# Patient Record
Sex: FEMALE | Race: WHITE | HISPANIC OR LATINO | Employment: STUDENT | URBAN - METROPOLITAN AREA
[De-identification: names, ages, dates, MRNs, and addresses within clinical notes are randomized per-mention and may not be internally consistent; named-entity substitution may affect disease eponyms.]

---
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
## 2022-02-23 ENCOUNTER — TELEPHONE (OUTPATIENT)
Dept: OTHER | Facility: OTHER | Age: 22
End: 2022-02-23

## 2022-02-23 NOTE — TELEPHONE ENCOUNTER
Patient is requesting to speak to Dr Kimber Purcell in regard to breast reduction  her insurance wants a letter sent to them on her behalf stating why it is needed and how her breast cause pain and issues for her  Patient has [employer]  Please advise if this letter could be sent

## 2022-03-16 ENCOUNTER — OFFICE VISIT (OUTPATIENT)
Dept: FAMILY MEDICINE CLINIC | Facility: CLINIC | Age: 22
End: 2022-03-16
Payer: COMMERCIAL

## 2022-03-16 VITALS
WEIGHT: 183.6 LBS | HEART RATE: 87 BPM | OXYGEN SATURATION: 98 % | RESPIRATION RATE: 15 BRPM | DIASTOLIC BLOOD PRESSURE: 70 MMHG | SYSTOLIC BLOOD PRESSURE: 100 MMHG | BODY MASS INDEX: 37.01 KG/M2 | TEMPERATURE: 97.9 F | HEIGHT: 59 IN

## 2022-03-16 DIAGNOSIS — N62 LARGE BREASTS: Primary | ICD-10-CM

## 2022-03-16 PROBLEM — G89.29 CHRONIC BILATERAL THORACIC BACK PAIN: Status: ACTIVE | Noted: 2022-03-16

## 2022-03-16 PROBLEM — M54.6 CHRONIC BILATERAL THORACIC BACK PAIN: Status: ACTIVE | Noted: 2022-03-16

## 2022-03-16 PROBLEM — R05.9 COUGH: Status: RESOLVED | Noted: 2020-08-21 | Resolved: 2022-03-16

## 2022-03-16 PROBLEM — J06.9 VIRAL UPPER RESPIRATORY TRACT INFECTION: Status: RESOLVED | Noted: 2020-02-10 | Resolved: 2022-03-16

## 2022-03-16 PROCEDURE — 99213 OFFICE O/P EST LOW 20 MIN: CPT | Performed by: FAMILY MEDICINE

## 2022-03-16 NOTE — PROGRESS NOTES
Assessment/Plan:    1  Large breasts  Assessment & Plan:  Discussed reduction with pt  Will schedule appt    Orders:  -     Ambulatory Referral to Plastic Surgery; Future    BMI Counseling: Body mass index is 37 21 kg/m²  The BMI is above normal  Nutrition recommendations include encouraging healthy choices of fruits and vegetables  Exercise recommendations include exercising 3-5 times per week  No pharmacotherapy was ordered  Rationale for BMI follow-up plan is due to patient being overweight or obese  Depression Screening and Follow-up Plan: Patient was screened for depression during today's encounter  They screened negative with a PHQ-2 score of 0  There are no Patient Instructions on file for this visit  No follow-ups on file  Subjective:      Patient ID: Kristy Carr is a 25 y o  female  Chief Complaint   Patient presents with    Back Pain     Patient here with back pain feels it's coming from her breast bra digs into shoulders rash under breast       Thinking about breast reduction  Wearing sports bras- possible G  Having chronic back pain  Trapezium pain  Has bran lines in shoulders  Neck pain into lower back    Back Pain  This is a chronic problem  The current episode started more than 1 year ago  The problem occurs constantly  The pain is present in the thoracic spine and lumbar spine  Treatments tried: massage, icy hot  The treatment provided no relief  The following portions of the patient's history were reviewed and updated as appropriate: allergies, current medications, past family history, past medical history, past social history, past surgical history and problem list     Review of Systems   Constitutional: Negative  HENT: Negative  Eyes: Negative  Respiratory: Negative  Cardiovascular: Negative  Gastrointestinal: Negative  Endocrine: Negative  Genitourinary: Negative  Musculoskeletal: Positive for back pain and neck pain     Allergic/Immunologic: Negative  Neurological: Negative  Hematological: Negative  Psychiatric/Behavioral: Negative  No current outpatient medications on file  No current facility-administered medications for this visit  Objective:    /70 (BP Location: Left arm, Patient Position: Sitting, Cuff Size: Large)   Pulse 87   Temp 97 9 °F (36 6 °C)   Resp 15   Ht 4' 10 9" (1 496 m)   Wt 83 3 kg (183 lb 9 6 oz)   SpO2 98%   BMI 37 21 kg/m²        Physical Exam  Vitals and nursing note reviewed  Constitutional:       Appearance: Normal appearance  HENT:      Head: Normocephalic and atraumatic  Cardiovascular:      Rate and Rhythm: Normal rate and regular rhythm  Pulses: Normal pulses  Heart sounds: Normal heart sounds  Pulmonary:      Effort: Pulmonary effort is normal       Breath sounds: Normal breath sounds  Musculoskeletal:         General: Tenderness and deformity (bra lines in shoulder) present  Neurological:      General: No focal deficit present  Mental Status: She is alert and oriented to person, place, and time  Psychiatric:         Mood and Affect: Mood normal          Behavior: Behavior normal          Thought Content:  Thought content normal          Judgment: Judgment normal                 Kathy Morale, DO

## 2022-03-22 ENCOUNTER — OFFICE VISIT (OUTPATIENT)
Dept: URGENT CARE | Age: 22
End: 2022-03-22
Payer: COMMERCIAL

## 2022-03-22 VITALS
DIASTOLIC BLOOD PRESSURE: 60 MMHG | WEIGHT: 186 LBS | BODY MASS INDEX: 37.5 KG/M2 | TEMPERATURE: 98 F | RESPIRATION RATE: 18 BRPM | SYSTOLIC BLOOD PRESSURE: 141 MMHG | HEART RATE: 98 BPM | OXYGEN SATURATION: 98 % | HEIGHT: 59 IN

## 2022-03-22 DIAGNOSIS — J30.1 SEASONAL ALLERGIC RHINITIS DUE TO POLLEN: Primary | ICD-10-CM

## 2022-03-22 PROCEDURE — 99213 OFFICE O/P EST LOW 20 MIN: CPT

## 2022-03-22 RX ORDER — LORATADINE 10 MG/1
10 TABLET ORAL DAILY
Qty: 30 TABLET | Refills: 0 | Status: SHIPPED | OUTPATIENT
Start: 2022-03-22

## 2022-03-22 NOTE — PATIENT INSTRUCTIONS
Allergic Rhinitis   WHAT YOU NEED TO KNOW:   Allergic rhinitis, or hay fever, is swelling of the inside of your nose  The swelling is a reaction to allergens in the air  An allergen can be anything that causes an allergic reaction  Allergies to weeds, grass, trees, or mold often cause seasonal allergic rhinitis  Indoor dust mites, cockroaches, pet dander, or mold can also cause allergic rhinitis  DISCHARGE INSTRUCTIONS:   Call 911 for the following:   · You have chest pain or shortness of breath  Return to the emergency department if:   · You have severe pain  · You cough up blood  Contact your healthcare provider if:   · You have a fever  · You have ear or sinus pain, or a headache  · Your symptoms get worse, even after treatment  · You have yellow, green, brown, or bloody mucus coming from your nose  · Your nose is bleeding or you have pain inside your nose  · You have trouble sleeping because of your symptoms  · You have questions or concerns about your condition or care  Medicines:   · Medicines  help decrease your symptoms and clear your stuffy nose  · Take your medicine as directed  Contact your healthcare provider if you think your medicine is not helping or if you have side effects  Tell him of her if you are allergic to any medicine  Keep a list of the medicines, vitamins, and herbs you take  Include the amounts, and when and why you take them  Bring the list or the pill bottles to follow-up visits  Carry your medicine list with you in case of an emergency  How to manage allergic rhinitis:  The best way to manage allergic rhinitis is to avoid allergens that can trigger your symptoms  Any of the following may help decrease your symptoms:  · Rinse your nose and sinuses  with a salt water solution or use a salt water nasal spray  This will help thin the mucus in your nose and rinse away pollen and dirt  It will also help reduce swelling so you can breathe normally  Ask your healthcare provider how often to rinse your nose  · Reduce exposure to dust mites  Wash sheets and towels in hot water every week  Cover your pillows and mattresses with allergen-free covers  Limit the number of stuffed animals and soft toys your child has  Wash your child's toys in hot water regularly  Vacuum weekly and use a vacuum  with an air filter  If possible, get rid of carpets and curtains  These collect dust and dust mites  · Reduce exposure to pollen  Keep windows and doors closed in your house and car  Stay inside when air pollution or the pollen count is high  Run your air conditioner on recycle, and change air filters often  Shower and wash your hair before bed every night to rinse away pollen  · Reduce exposure to pet dander  If possible, do not keep cats, dogs, birds, or other pets  If you do keep pets in your home, keep them out of bedrooms and carpeted rooms  Bathe them often  · Reduce exposure to mold  Do not spend time in basements  Choose artificial plants instead of live plants  Keep your home's humidity at less than 45%  Do not have ponds or standing water in your home or yard  · Do not smoke  Avoid others who smoke  Ask your healthcare provider for information if you currently smoke and need help to quit  Follow up with your healthcare provider as directed: You may need to see an allergist often to control your symptoms  Write down your questions so you remember to ask them during your visits  © [employer] 2022 Information is for End User's use only and may not be sold, redistributed or otherwise used for commercial purposes  All illustrations and images included in CareNotes® are the copyrighted property of A D A M , Inc  or [facility] Cheyenne Caldwell   The above information is an  only  It is not intended as medical advice for individual conditions or treatments   Talk to your doctor, nurse or pharmacist before following any medical regimen to see if it is safe and effective for you

## 2022-03-22 NOTE — PROGRESS NOTES
[facility]        NAME: Belinda Sousa is a 25 y o  female  : 2000    MRN: [id number]  DATE: 2022  TIME: 1:36 PM    Assessment and Plan   Seasonal allergic rhinitis due to pollen [J30 1]  1  Seasonal allergic rhinitis due to pollen  loratadine (CLARITIN) 10 mg tablet       The patient has a history of seasonal allergies  Currently having congestion and post nasal drip  No fevers, no nausea  Will order Claritin  Patient Instructions     Take daily Claritin  Follow up with PCP as needed  Proceed to  ER if symptoms worsen  Chief Complaint     Chief Complaint   Patient presents with    Vomiting     pt states she has been vomting since   No diarrhea reported  History of Present Illness       The patient is a 24 y/o female who presents with complaints of  vomiting at night for 2 days  Has had 2 episodes of vomitting in the past 2 days  Denies nausea, denies fevers  Also has c/o congestion  Able to eat/drink without issue  Does have seasonal allergies  She is not taking anything currently  Vomiting   This is a new problem  The current episode started in the past 7 days  The problem occurs less than 2 times per day  The problem has been unchanged  There has been no fever  Associated symptoms include URI  Pertinent negatives include no abdominal pain, chest pain, chills, coughing, diarrhea, fever, headaches or weight loss  Risk factors include ill contacts  She has tried nothing for the symptoms  The treatment provided no relief  Review of Systems   Review of Systems   Constitutional: Negative for chills, fever and weight loss  HENT: Positive for congestion, postnasal drip and rhinorrhea  Negative for ear pain and sinus pain  Throat scratchy   Respiratory: Negative for cough  Cardiovascular: Negative for chest pain  Gastrointestinal: Positive for vomiting  Negative for abdominal pain and diarrhea     Neurological: Negative for weakness, light-headedness and headaches  Current Medications       Current Outpatient Medications:     loratadine (CLARITIN) 10 mg tablet, Take 1 tablet (10 mg total) by mouth daily, Disp: 30 tablet, Rfl: 0    Current Allergies     Allergies as of 03/22/2022 - Reviewed 03/22/2022   Allergen Reaction Noted    Dust mite extract Wheezing 04/14/2014            The following portions of the patient's history were reviewed and updated as appropriate: allergies, current medications, past family history, past medical history, past social history, past surgical history and problem list      Past Medical History:   Diagnosis Date    Asthma     Goiter     r/s 8/2016       History reviewed  No pertinent surgical history  Family History   Problem Relation Age of Onset    Arthritis Father     Hypertension Father     Diabetes Father     Benign prostatic hyperplasia Father     Arthritis Sister          Medications have been verified  Objective   /60   Pulse 98   Temp 98 °F (36 7 °C)   Resp 18   Ht 4' 11" (1 499 m)   Wt 84 4 kg (186 lb)   LMP 03/07/2022 (Exact Date)   SpO2 98%   BMI 37 57 kg/m²   Patient's last menstrual period was 03/07/2022 (exact date)  Physical Exam     Physical Exam  Vitals reviewed  Constitutional:       General: She is not in acute distress  Appearance: Normal appearance  She is obese  HENT:      Head: Normocephalic  Right Ear: Tympanic membrane, ear canal and external ear normal       Left Ear: Tympanic membrane, ear canal and external ear normal       Nose: Congestion and rhinorrhea present  Mouth/Throat:      Mouth: Mucous membranes are moist       Pharynx: Oropharynx is clear  No posterior oropharyngeal erythema  Eyes:      Extraocular Movements: Extraocular movements intact  Pupils: Pupils are equal, round, and reactive to light  Cardiovascular:      Rate and Rhythm: Normal rate and regular rhythm  Pulses: Normal pulses        Heart sounds: Normal heart sounds  Pulmonary:      Effort: Pulmonary effort is normal       Breath sounds: Normal breath sounds  Abdominal:      General: Bowel sounds are normal       Palpations: Abdomen is soft  Musculoskeletal:         General: Normal range of motion  Cervical back: Normal range of motion  Skin:     General: Skin is warm and dry  Capillary Refill: Capillary refill takes less than 2 seconds  Neurological:      General: No focal deficit present  Mental Status: She is alert and oriented to person, place, and time  Mental status is at baseline  Psychiatric:         Mood and Affect: Mood normal          Behavior: Behavior normal          Thought Content:  Thought content normal          Judgment: Judgment normal

## 2022-04-12 ENCOUNTER — TELEPHONE (OUTPATIENT)
Dept: PLASTIC SURGERY | Facility: CLINIC | Age: 22
End: 2022-04-12

## 2022-04-12 NOTE — TELEPHONE ENCOUNTER
As patient was in school, spoke to mother who called back about a breast reduction appointment  Reviewed criteria and patient will let us know when one more month of documentation and pt is had in order to make appointment  Also emailing letter to patient with criteria

## 2022-04-28 ENCOUNTER — TELEMEDICINE (OUTPATIENT)
Dept: FAMILY MEDICINE CLINIC | Facility: CLINIC | Age: 22
End: 2022-04-28
Payer: COMMERCIAL

## 2022-04-28 VITALS — TEMPERATURE: 96.1 F

## 2022-04-28 DIAGNOSIS — J06.9 ACUTE UPPER RESPIRATORY INFECTION: Primary | ICD-10-CM

## 2022-04-28 PROCEDURE — 99213 OFFICE O/P EST LOW 20 MIN: CPT | Performed by: NURSE PRACTITIONER

## 2022-04-28 RX ORDER — AMOXICILLIN 875 MG/1
875 TABLET, COATED ORAL 2 TIMES DAILY
Qty: 20 TABLET | Refills: 0 | Status: SHIPPED | OUTPATIENT
Start: 2022-04-28 | End: 2022-05-08

## 2022-04-28 NOTE — PROGRESS NOTES
Virtual Regular Visit    Verification of patient location:    Patient is located in the following state in which I hold an active license [place]      Assessment/Plan:    Problem List Items Addressed This Visit     None      Visit Diagnoses     Acute upper respiratory infection    -  Primary    Relevant Medications    amoxicillin (AMOXIL) 875 mg tablet               Reason for visit is   Chief Complaint   Patient presents with    Virtual Regular Visit    Cold Like Symptoms        Encounter provider DENNY Shah    Provider located at [place] [address] [place] [id number]      Recent Visits  No visits were found meeting these conditions  Showing recent visits within past 7 days and meeting all other requirements  Today's Visits  Date Type Provider Dept   04/28/22 Telemedicine DENNY Shah [facility]   Showing today's visits and meeting all other requirements  Future Appointments  No visits were found meeting these conditions  Showing future appointments within next 150 days and meeting all other requirements       The patient was identified by name and date of birth  Chiqui Pineda was informed that this is a telemedicine visit and that the visit is being conducted through [address] [place] [address] Now and patient was informed that this is a secure, HIPAA-compliant platform  She agrees to proceed     My office door was closed  No one else was in the room  She acknowledged consent and understanding of privacy and security of the video platform  The patient has agreed to participate and understands they can discontinue the visit at any time  Patient is aware this is a billable service  Subjective  Chiqui Pineda is a 25 y o  female          Started to get sick 2 days ago  Stuffy nose and dry throat  Headache  No fever  No cough  Took allergy meds for this  Feels like cold  No risk of covid or flu           Past Medical History:   Diagnosis Date    Asthma  Goiter     r/s 8/2016       History reviewed  No pertinent surgical history  Current Outpatient Medications   Medication Sig Dispense Refill    amoxicillin (AMOXIL) 875 mg tablet Take 1 tablet (875 mg total) by mouth 2 (two) times a day for 10 days 20 tablet 0    loratadine (CLARITIN) 10 mg tablet Take 1 tablet (10 mg total) by mouth daily 30 tablet 0     No current facility-administered medications for this visit  Allergies   Allergen Reactions    Dust Mite Extract Wheezing       Review of Systems   Constitutional: Positive for fatigue  Negative for fever  HENT: Positive for congestion, postnasal drip, rhinorrhea and sore throat  Negative for ear pain  Respiratory: Negative for cough  Cardiovascular: Negative for chest pain and palpitations  Gastrointestinal: Negative for constipation and diarrhea  Genitourinary: Negative for dysuria and frequency  Musculoskeletal: Negative for arthralgias and myalgias  Skin: Negative for rash  Neurological: Positive for headaches  Negative for dizziness and light-headedness  Hematological: Negative for adenopathy  Psychiatric/Behavioral: Negative for dysphoric mood  The patient is not nervous/anxious  Video Exam    Vitals:    04/28/22 1129   Temp: (!) 96 1 °F (35 6 °C)   TempSrc: Oral       Physical Exam  Constitutional:       Appearance: She is ill-appearing  HENT:      Head: Normocephalic  Eyes:      Conjunctiva/sclera: Conjunctivae normal    Pulmonary:      Effort: Pulmonary effort is normal    Musculoskeletal:         General: Normal range of motion  Cervical back: Normal range of motion  Neurological:      Mental Status: She is alert and oriented to person, place, and time     Psychiatric:         Mood and Affect: Mood normal          Behavior: Behavior normal           I spent 15 minutes with patient today in which greater than 50% of the time was spent in counseling/coordination of care regarding uri    VIRTUAL VISIT DISCLAIMER      Danette Grove verbally agrees to participate in [employer]  Pt is aware that [employer] could be limited without vital signs or the ability to perform a full hands-on physical Earlean Beardawit understands she or the provider may request at any time to terminate the video visit and request the patient to seek care or treatment in person

## 2022-06-12 ENCOUNTER — HOSPITAL ENCOUNTER (EMERGENCY)
Facility: HOSPITAL | Age: 22
Discharge: HOME | End: 2022-06-12
Admitting: EMERGENCY MEDICINE
Payer: COMMERCIAL

## 2022-06-12 VITALS
HEIGHT: 59 IN | BODY MASS INDEX: 37.78 KG/M2 | RESPIRATION RATE: 16 BRPM | WEIGHT: 187.39 LBS | HEART RATE: 89 BPM | DIASTOLIC BLOOD PRESSURE: 57 MMHG | TEMPERATURE: 98.2 F | SYSTOLIC BLOOD PRESSURE: 115 MMHG | OXYGEN SATURATION: 99 %

## 2022-06-12 DIAGNOSIS — R11.2 NAUSEA AND VOMITING: Primary | ICD-10-CM

## 2022-06-12 LAB
BACTERIA UR QL AUTO: ABNORMAL /HPF
BILIRUB UR QL STRIP: NEGATIVE
CLARITY UR: CLEAR
COLOR UR: YELLOW
EXT PREG TEST URINE: NEGATIVE
EXT. CONTROL ED NAV: NORMAL
GLUCOSE UR STRIP-MCNC: NEGATIVE MG/DL
HGB UR QL STRIP.AUTO: ABNORMAL
KETONES UR STRIP-MCNC: NEGATIVE MG/DL
LEUKOCYTE ESTERASE UR QL STRIP: NEGATIVE
NITRITE UR QL STRIP: NEGATIVE
NON-SQ EPI CELLS URNS QL MICRO: ABNORMAL /HPF
PH UR STRIP.AUTO: 5.5 [PH] (ref 4.5–8)
PROT UR STRIP-MCNC: ABNORMAL MG/DL
RBC #/AREA URNS AUTO: ABNORMAL /HPF
SP GR UR STRIP.AUTO: >=1.03 (ref 1–1.03)
UROBILINOGEN UR QL STRIP.AUTO: 0.2 E.U./DL
WBC #/AREA URNS AUTO: ABNORMAL /HPF

## 2022-06-12 PROCEDURE — 99283 EMERGENCY DEPT VISIT LOW MDM: CPT

## 2022-06-12 PROCEDURE — 81001 URINALYSIS AUTO W/SCOPE: CPT

## 2022-06-12 PROCEDURE — 81025 URINE PREGNANCY TEST: CPT | Performed by: PHYSICIAN ASSISTANT

## 2022-06-12 PROCEDURE — 99284 EMERGENCY DEPT VISIT MOD MDM: CPT | Performed by: PHYSICIAN ASSISTANT

## 2022-06-12 RX ORDER — ONDANSETRON 4 MG/1
4 TABLET, ORALLY DISINTEGRATING ORAL EVERY 6 HOURS PRN
Qty: 20 TABLET | Refills: 0 | Status: SHIPPED | OUTPATIENT
Start: 2022-06-12

## 2022-06-12 RX ORDER — ONDANSETRON 4 MG/1
4 TABLET, ORALLY DISINTEGRATING ORAL EVERY 6 HOURS PRN
Qty: 20 TABLET | Refills: 0 | Status: SHIPPED | OUTPATIENT
Start: 2022-06-12 | End: 2022-06-12 | Stop reason: SDUPTHER

## 2022-06-12 NOTE — ED PROVIDER NOTES
History  Chief Complaint   Patient presents with    Vomiting     Pt reports awakening and vomiting x 2  Pt has taken Pepto Bismol and took Zofran provided by sister  Last vomited at 0300     Patient is a 68-year-old female with no significant past medical history who presents with 2 episodes of vomiting this morning  Patient reports she woke up this morning and had mild chills  She had 1 episode of nonbloody, nonbilious vomiting  She took Pepto and Zofran had a 2nd episode of nonbloody, nonbilious vomiting, last episode at 3:00 a m  [facility] Patient has not vomited since that time  Patient has not tried to eat or drink anything since vomiting  Patient denies any associated abdominal pain, diarrhea, fevers, diaphoresis, headache, congestion, cough, shortness of breath, chest pain, palpitations, dysuria, hematuria urinary frequency or urgency, recent travel, known sick contacts, new foods or medications  Patient states her symptoms have resolved and denies any current complaints  Prior to Admission Medications   Prescriptions Last Dose Informant Patient Reported? Taking?   loratadine (CLARITIN) 10 mg tablet   No No   Sig: Take 1 tablet (10 mg total) by mouth daily      Facility-Administered Medications: None       Past Medical History:   Diagnosis Date    Asthma     Goiter     r/s 8/2016       History reviewed  No pertinent surgical history  Family History   Problem Relation Age of Onset    Arthritis Father     Hypertension Father     Diabetes Father     Benign prostatic hyperplasia Father     Arthritis Sister      I have reviewed and agree with the history as documented      E-Cigarette/Vaping    E-Cigarette Use Never User      E-Cigarette/Vaping Substances     Social History     Tobacco Use    Smoking status: Never Smoker    Smokeless tobacco: Never Used   Vaping Use    Vaping Use: Never used   Substance Use Topics    Alcohol use: No    Drug use: No       Review of Systems   Constitutional: Positive for chills  Negative for diaphoresis and fever  HENT: Negative for congestion, rhinorrhea and sore throat  Respiratory: Negative for cough, shortness of breath, wheezing and stridor  Cardiovascular: Negative for chest pain and palpitations  Gastrointestinal: Positive for nausea and vomiting  Negative for abdominal pain and diarrhea  Genitourinary: Negative for difficulty urinating, dysuria, frequency, hematuria and urgency  Musculoskeletal: Negative for neck pain  Skin: Negative for color change, pallor and rash  Neurological: Negative for dizziness, light-headedness and headaches  All other systems reviewed and are negative  Physical Exam  Physical Exam  Vitals and nursing note reviewed  Constitutional:       General: She is awake  She is not in acute distress  Appearance: She is well-developed  She is not ill-appearing, toxic-appearing or diaphoretic  HENT:      Head: Normocephalic and atraumatic  Right Ear: External ear normal       Left Ear: External ear normal       Nose: Nose normal    Eyes:      Conjunctiva/sclera: Conjunctivae normal       Pupils: Pupils are equal, round, and reactive to light  Cardiovascular:      Rate and Rhythm: Normal rate and regular rhythm  Pulses: Normal pulses  Heart sounds: Normal heart sounds  Pulmonary:      Effort: Pulmonary effort is normal  No respiratory distress  Breath sounds: Normal breath sounds  No stridor  No decreased breath sounds or wheezing  Abdominal:      General: Bowel sounds are normal  There is no distension  Palpations: Abdomen is soft  Tenderness: There is no abdominal tenderness  There is no right CVA tenderness or left CVA tenderness  Musculoskeletal:         General: Normal range of motion  Cervical back: Normal range of motion and neck supple  Comments: Moving all extremities freely, ambulating without issue   Skin:     General: Skin is warm and dry        Capillary Refill: Capillary refill takes less than 2 seconds  Neurological:      Mental Status: She is alert and oriented to person, place, and time  GCS: GCS eye subscore is 4  GCS verbal subscore is 5  GCS motor subscore is 6  Psychiatric:         Behavior: Behavior is cooperative           Vital Signs  ED Triage Vitals [06/12/22 0413]   Temperature Pulse Respirations Blood Pressure SpO2   98 2 °F (36 8 °C) 89 16 115/57 99 %      Temp Source Heart Rate Source Patient Position - Orthostatic VS BP Location FiO2 (%)   Oral Monitor Sitting Right arm --      Pain Score       No Pain           Vitals:    06/12/22 0413   BP: 115/57   Pulse: 89   Patient Position - Orthostatic VS: Sitting         Visual Acuity      ED Medications  Medications - No data to display    Diagnostic Studies  Results Reviewed     Procedure Component Value Units Date/Time    Urine Microscopic [[id number]]  (Abnormal) Collected: 06/12/22 0640    Lab Status: Final result Specimen: Urine, Clean Catch Updated: 06/12/22 0753     RBC, UA 0-1 /hpf      WBC, UA 0-1 /hpf      Epithelial Cells Occasional /hpf      Bacteria, UA None Seen /hpf     Urine Macroscopic, POC [[id number]]  (Abnormal) Collected: 06/12/22 0640    Lab Status: Final result Specimen: Urine Updated: 06/12/22 0644     Color, UA Yellow     Clarity, UA Clear     pH, UA 5 5     Leukocytes, UA Negative     Nitrite, UA Negative     Protein, UA 30 (1+) mg/dl      Glucose, UA Negative mg/dl      Ketones, UA Negative mg/dl      Urobilinogen, UA 0 2 E U /dl      Bilirubin, UA Negative     Blood, UA Trace     Specific Gravity, UA >=1 030    Narrative:      CLINITEK RESULT    POCT pregnancy, urine [[id number]]  (Normal) Resulted: 06/12/22 0642    Lab Status: Final result Updated: 06/12/22 0642     EXT PREG TEST UR (Ref: Negative) Negative     Control Valid                 No orders to display              Procedures  Procedures         ED Course  ED Course as of 06/12/22 0900   [place] Jun 12, 2022   8026 Blood, UA(!): Trace  Patient is finishing her menstrual period         CRAFFT    Flowsheet Row Most Recent Value   SBIRT (13-23 yo)    In order to provide better care to our patients, we are screening all of our patients for alcohol and drug use  Would it be okay to ask you these screening questions? No Filed at: 06/12/2022 7957                                          [facility]  Number of Diagnoses or Management Options  Nausea and vomiting  Diagnosis management comments: Reviewed results with patient and family at bedside  Patient tolerating p o  Without issue  Reviewed medication education, treatment at home, encouraged hydration, reviewed brat diet  The management plan was discussed in detail with the patient and family at bedside and all questions were answered  Provided both verbal and written instructions  Reviewed red flag symptoms and strict return to ED instructions  Patient and family notes understanding and agrees to plan  Disposition  Final diagnoses:   Nausea and vomiting     Time reflects when diagnosis was documented in both MDM as applicable and the Disposition within this note     Time User Action Codes Description Comment    6/12/2022  6:44 AM Katherine Holbrook Add [R11 2] Nausea and vomiting       ED Disposition     ED Disposition   Discharge    Condition   Stable    Date/Time   Sun Jun 12, 2022  6:44 AM    Comment   Lynsey Owens discharge to home/self care                 Follow-up Information     Follow up With Specialties Details Why Contact Info Additional [address] [facility] Emergency Department Emergency Medicine  If symptoms worsen [facility] [id number]  [address] [place] [address] Emergency Department, [address] [place] [address], [address], DO Family Medicine In 2 days  [address]  [address] [address] [place] [address]   [phone]             Discharge Medication List as of 6/12/2022  6:45 AM START taking these medications    Details   ondansetron (ZOFRAN-ODT) 4 mg disintegrating tablet Take 1 tablet (4 mg total) by mouth every 6 (six) hours as needed for nausea or vomiting, Starting Sun 6/12/2022, Normal         CONTINUE these medications which have NOT CHANGED    Details   loratadine (CLARITIN) 10 mg tablet Take 1 tablet (10 mg total) by mouth daily, Starting Tue 3/22/2022, Normal             No discharge procedures on file      PDMP Review     None          ED Provider  Electronically Signed by           Harmony Medellin PA-C  06/12/22 0900

## 2022-06-12 NOTE — DISCHARGE INSTRUCTIONS
Take Zofran as prescribed as needed for nausea and vomiting  Take Motrin or Tylenol for pain  Rest and drink plenty of fluids  Slow introduction of foods like broth, bread, rice, and other bland foods  Follow-up with PCP for monitoring of symptoms  Return to ED if symptoms worsen including increasing pain, inability to tolerate food or fluid, blood in vomit or stool, fevers

## 2022-07-25 ENCOUNTER — OFFICE VISIT (OUTPATIENT)
Dept: URGENT CARE | Age: 22
End: 2022-07-25
Payer: COMMERCIAL

## 2022-07-25 VITALS
RESPIRATION RATE: 18 BRPM | HEART RATE: 97 BPM | SYSTOLIC BLOOD PRESSURE: 108 MMHG | TEMPERATURE: 97.9 F | DIASTOLIC BLOOD PRESSURE: 64 MMHG | OXYGEN SATURATION: 98 %

## 2022-07-25 DIAGNOSIS — H60.501 ACUTE OTITIS EXTERNA OF RIGHT EAR, UNSPECIFIED TYPE: Primary | ICD-10-CM

## 2022-07-25 PROCEDURE — 99214 OFFICE O/P EST MOD 30 MIN: CPT | Performed by: PHYSICIAN ASSISTANT

## 2022-07-25 RX ORDER — OFLOXACIN 3 MG/ML
10 SOLUTION AURICULAR (OTIC) DAILY
Qty: 5 ML | Refills: 0 | Status: SHIPPED | OUTPATIENT
Start: 2022-07-25 | End: 2022-08-01

## 2022-07-25 NOTE — PROGRESS NOTES
[facility]        NAME: Grupo Conroy is a 25 y o  female  : 2000    MRN: [id number]  DATE: 2022  TIME: 3:03 PM    Assessment and Plan   Acute otitis externa of right ear, unspecified type [H60 501]  1  Acute otitis externa of right ear, unspecified type  ofloxacin (FLOXIN) 0 3 % otic solution   Pt presents with symptoms and examination consistent with otitis externa  Pt will be started on ofloxacin drops to treat and treatment course is more simplified making compliance more likely lowering possibility of complications  She will follow-up with her PCP in 2-3 days if symptoms are not improved and will report to the emergency department if symptoms worsen, or new symptoms such as dizziness or lightheadedness develop  Patient Instructions     Patient Instructions   Antibiotic drops as directed  If symptoms do not improve in the next 2-3 days follow-up with PCP  If new symptoms develop such as dizziness or lightheadedness or symptoms worsen report to the emergency room  Follow up with PCP in 3-5 days  Proceed to  ER if symptoms worsen  Chief Complaint     Chief Complaint   Patient presents with    Earache     Pt c/o right ear and jaw pain started two days ago  Denies fever, CP, SOB         History of Present Illness       25year old female presents with her mother with complaint of     Earache   There is pain in the right ear  This is a new problem  Episode onset: 2 days ago  The problem occurs constantly  The problem has been gradually worsening  There has been no fever  The pain is at a severity of 6/10  The pain is moderate  Associated symptoms include hearing loss (muffled)  Pertinent negatives include no abdominal pain, coughing, ear discharge, headaches, neck pain, rash, rhinorrhea or sore throat  Associated symptoms comments: Jaw pain  She has tried nothing for the symptoms  The treatment provided no relief   There is no history of a chronic ear infection, hearing loss or a tympanostomy tube  Review of Systems   Review of Systems   Constitutional: Negative for chills and fever  HENT: Positive for ear pain and hearing loss (muffled)  Negative for ear discharge, rhinorrhea and sore throat  Respiratory: Negative for cough  Cardiovascular: Negative for chest pain  Gastrointestinal: Negative for abdominal pain  Musculoskeletal: Negative for neck pain  Skin: Negative for rash  Neurological: Negative for headaches  Current Medications       Current Outpatient Medications:     ofloxacin (FLOXIN) 0 3 % otic solution, Administer 10 drops to the right ear daily for 7 days, Disp: 5 mL, Rfl: 0    loratadine (CLARITIN) 10 mg tablet, Take 1 tablet (10 mg total) by mouth daily (Patient not taking: Reported on 7/25/2022), Disp: 30 tablet, Rfl: 0    ondansetron (ZOFRAN-ODT) 4 mg disintegrating tablet, Take 1 tablet (4 mg total) by mouth every 6 (six) hours as needed for nausea or vomiting (Patient not taking: Reported on 7/25/2022), Disp: 20 tablet, Rfl: 0    Current Allergies     Allergies as of 07/25/2022 - Reviewed 07/25/2022   Allergen Reaction Noted    Dust mite extract Wheezing 04/14/2014            The following portions of the patient's history were reviewed and updated as appropriate: allergies, current medications, past family history, past medical history, past social history, past surgical history and problem list      Past Medical History:   Diagnosis Date    Asthma     Goiter     r/s 8/2016       No past surgical history on file  Family History   Problem Relation Age of Onset    Arthritis Father     Hypertension Father     Diabetes Father     Benign prostatic hyperplasia Father     Arthritis Sister          Medications have been verified  Objective   /64   Pulse 97   Temp 97 9 °F (36 6 °C) (Tympanic)   Resp 18   SpO2 98%   No LMP recorded  Physical Exam     Physical Exam  Vitals and nursing note reviewed  Constitutional:       General: She is awake  She is not in acute distress  Appearance: Normal appearance  She is well-developed and well-groomed  She is not ill-appearing, toxic-appearing or diaphoretic  HENT:      Head: Normocephalic and atraumatic  Jaw: No trismus  Right Ear: Hearing, tympanic membrane, ear canal and external ear normal  Swelling and tenderness present  No drainage  No middle ear effusion  There is no impacted cerumen  No foreign body  No mastoid tenderness  No PE tube  No hemotympanum  Tympanic membrane is not injected, scarred, perforated, erythematous, retracted or bulging  Tympanic membrane has normal mobility  Left Ear: Hearing, tympanic membrane, ear canal and external ear normal  No drainage, swelling or tenderness  No middle ear effusion  There is no impacted cerumen  No foreign body  No mastoid tenderness  No PE tube  No hemotympanum  Tympanic membrane is not injected, scarred, perforated, erythematous, retracted or bulging  Tympanic membrane has normal mobility  Ears:      Comments: Erythema of the ear canal and tenderness with tragal palpation and auricular motion  Nose: No nasal deformity, mucosal edema, congestion or rhinorrhea  Right Nostril: No foreign body, epistaxis or occlusion  Left Nostril: No foreign body, epistaxis or occlusion  Right Turbinates: Not enlarged, swollen or pale  Left Turbinates: Not enlarged, swollen or pale  Mouth/Throat:      Lips: Pink  No lesions  Mouth: Mucous membranes are moist  No injury, oral lesions or angioedema  Dentition: Normal dentition  Tongue: No lesions  Tongue does not deviate from midline  Palate: No mass and lesions  Pharynx: Uvula midline  No pharyngeal swelling, oropharyngeal exudate, posterior oropharyngeal erythema or uvula swelling  Tonsils: No tonsillar exudate or tonsillar abscesses  Eyes:      General: Lids are normal  Vision grossly intact  Gaze aligned appropriately  No allergic shiner  Extraocular Movements: Extraocular movements intact  Cardiovascular:      Rate and Rhythm: Normal rate  Pulmonary:      Effort: Pulmonary effort is normal       Comments: Patient is speaking in full sentences with no increased respiratory effort  No audible wheezing or stridor  Musculoskeletal:      Cervical back: Normal range of motion  Lymphadenopathy:      Cervical: No cervical adenopathy  Skin:     General: Skin is warm and dry  Neurological:      Mental Status: She is alert and oriented to person, place, and time  Coordination: Coordination is intact  Gait: Gait is intact  Psychiatric:         Attention and Perception: Attention and perception normal          Mood and Affect: Mood and affect normal          Speech: Speech normal          Behavior: Behavior normal  Behavior is cooperative  Note: Portions of this record may have been created with voice recognition software  Occasional wrong word or "sound a like" substitutions may have occurred due to the inherent limitations of voice recognition software  Please read the chart carefully and recognize, using context, where substitutions have occurred  *

## 2022-07-25 NOTE — PATIENT INSTRUCTIONS
Antibiotic drops as directed  If symptoms do not improve in the next 2-3 days follow-up with PCP  If new symptoms develop such as dizziness or lightheadedness or symptoms worsen report to the emergency room

## 2022-11-02 ENCOUNTER — TELEPHONE (OUTPATIENT)
Dept: FAMILY MEDICINE CLINIC | Facility: CLINIC | Age: 22
End: 2022-11-02

## 2022-11-02 NOTE — TELEPHONE ENCOUNTER
Patients mom called and stated that her insurance changed and that the insurance stated that pcp office had to to a prior auth for breast reduction  She stated that we did one when she had [employer] but needs another one done as she switched insurances  Please advise if this is something we can do

## 2022-11-15 ENCOUNTER — TELEMEDICINE (OUTPATIENT)
Dept: FAMILY MEDICINE CLINIC | Facility: CLINIC | Age: 22
End: 2022-11-15

## 2022-11-15 DIAGNOSIS — J06.9 ACUTE UPPER RESPIRATORY INFECTION: Primary | ICD-10-CM

## 2022-11-15 RX ORDER — AMOXICILLIN 875 MG/1
875 TABLET, COATED ORAL 2 TIMES DAILY
Qty: 20 TABLET | Refills: 0 | Status: SHIPPED | OUTPATIENT
Start: 2022-11-15 | End: 2022-11-25

## 2022-11-15 RX ORDER — BENZONATATE 200 MG/1
200 CAPSULE ORAL 3 TIMES DAILY PRN
Qty: 20 CAPSULE | Refills: 0 | Status: SHIPPED | OUTPATIENT
Start: 2022-11-15

## 2022-11-15 NOTE — LETTER
November 15, 2022     Patient: Gabriel Short  YOB: 2000  Date of Visit: 11/15/2022      To Whom it May Concern:    Gabriel Short is under my professional care  Christi Maurice was seen in my office on 11/15/2022  Christi Maurice may return to work on 11/17/2022  Unable to work due to illness  If you have any questions or concerns, please don't hesitate to call           Sincerely,          DENNY Dee        CC: No Recipients

## 2022-11-15 NOTE — PROGRESS NOTES
Virtual Regular Visit    Verification of patient location:    Patient is located in the following state in which I hold an active license [place]      Assessment/Plan:    Problem List Items Addressed This Visit    None     Visit Diagnoses     Acute upper respiratory infection    -  Primary    Relevant Medications    amoxicillin (AMOXIL) 875 mg tablet    benzonatate (TESSALON) 200 MG capsule               Reason for visit is   Chief Complaint   Patient presents with   • Virtual Brief Visit     coughing, sneezing, trouble breathing with asthma   • Virtual Regular Visit        Encounter provider DENNY Culver    Provider located at [address] [address]      Recent Visits  No visits were found meeting these conditions  Showing recent visits within past 7 days and meeting all other requirements  Today's Visits  Date Type Provider Dept   11/15/22 Telemedicine DENNY Culver [facility]   Showing today's visits and meeting all other requirements  Future Appointments  No visits were found meeting these conditions  Showing future appointments within next 150 days and meeting all other requirements       The patient was identified by name and date of birth  Vadim Phipps was informed that this is a telemedicine visit and that the visit is being conducted through the [employer]  She agrees to proceed     My office door was closed  No one else was in the room  She acknowledged consent and understanding of privacy and security of the video platform  The patient has agreed to participate and understands they can discontinue the visit at any time  Patient is aware this is a billable service  Subjective  Vadim Phipps is a 23 y o  female          Started to get ill Sunday  Coughing  Sore throat  Ears hurt  Back is hurting  Took tylenol cold and flu  No chance for covid  Test was negative         Past Medical History: Diagnosis Date   • Asthma    • Goiter     r/s 8/2016       History reviewed  No pertinent surgical history  Current Outpatient Medications   Medication Sig Dispense Refill   • amoxicillin (AMOXIL) 875 mg tablet Take 1 tablet (875 mg total) by mouth 2 (two) times a day for 10 days 20 tablet 0   • benzonatate (TESSALON) 200 MG capsule Take 1 capsule (200 mg total) by mouth 3 (three) times a day as needed for cough 20 capsule 0   • loratadine (CLARITIN) 10 mg tablet Take 1 tablet (10 mg total) by mouth daily 30 tablet 0   • ondansetron (ZOFRAN-ODT) 4 mg disintegrating tablet Take 1 tablet (4 mg total) by mouth every 6 (six) hours as needed for nausea or vomiting 20 tablet 0     No current facility-administered medications for this visit  Allergies   Allergen Reactions   • Dust Mite Extract Wheezing       Review of Systems   Constitutional: Positive for fatigue  Negative for chills and fever  HENT: Positive for congestion, postnasal drip, rhinorrhea and sore throat  Negative for ear pain  Respiratory: Positive for cough  Negative for shortness of breath and wheezing  Cardiovascular: Negative for chest pain and palpitations  Gastrointestinal: Negative for constipation, diarrhea, nausea and vomiting  Genitourinary: Negative for dysuria and frequency  Musculoskeletal: Positive for back pain and myalgias  Negative for arthralgias  Skin: Negative for rash  Neurological: Negative for dizziness, light-headedness and headaches  Hematological: Negative for adenopathy  Psychiatric/Behavioral: Negative for dysphoric mood and sleep disturbance  The patient is not nervous/anxious  Video Exam    Vitals:       Physical Exam  Constitutional:       Appearance: She is ill-appearing  HENT:      Head: Normocephalic and atraumatic  Eyes:      Conjunctiva/sclera: Conjunctivae normal    Pulmonary:      Effort: Pulmonary effort is normal    Musculoskeletal:         General: Normal range of motion  Cervical back: Normal range of motion  Neurological:      Mental Status: She is alert and oriented to person, place, and time     Psychiatric:         Mood and Affect: Mood normal          Behavior: Behavior normal           I spent 10 minutes with patient today in which greater than 50% of the time was spent in counseling/coordination of care regarding uri

## 2022-12-29 ENCOUNTER — TELEMEDICINE (OUTPATIENT)
Dept: FAMILY MEDICINE CLINIC | Facility: CLINIC | Age: 22
End: 2022-12-29

## 2022-12-29 DIAGNOSIS — J02.9 SORE THROAT: Primary | ICD-10-CM

## 2022-12-29 RX ORDER — AMOXICILLIN 875 MG/1
875 TABLET, COATED ORAL 2 TIMES DAILY
Qty: 20 TABLET | Refills: 0 | Status: SHIPPED | OUTPATIENT
Start: 2022-12-29 | End: 2023-01-08

## 2022-12-29 NOTE — PROGRESS NOTES
Virtual Regular Visit    Verification of patient location:    Patient is located in the following state in which I hold an active license [place]      Assessment/Plan:    Problem List Items Addressed This Visit    None  Visit Diagnoses     Sore throat    -  Primary    Relevant Medications    amoxicillin (AMOXIL) 875 mg tablet               Reason for visit is   Chief Complaint   Patient presents with   • Virtual Brief Visit   • Virtual Regular Visit        Encounter provider DENNY Hill    Provider located at [address] [address]      Recent Visits  Date Type Provider Dept   12/29/22 [address] DENNY Ghosh [facility]   Showing recent visits within past 7 days and meeting all other requirements  Future Appointments  No visits were found meeting these conditions  Showing future appointments within next 150 days and meeting all other requirements       The patient was identified by name and date of birth  Broderick Gurrola was informed that this is a telemedicine visit and that the visit is being conducted through the [address] [employer] platform  She agrees to proceed     My office door was closed  No one else was in the room  She acknowledged consent and understanding of privacy and security of the video platform  The patient has agreed to participate and understands they can discontinue the visit at any time  Patient is aware this is a billable service  Subjective  Broderick Gurrola is a 23 y o  female   Ill visit  Started to get ill Tuesday  Body aches  Headache  Sore throat  Ears hurt  Congested  No fever  Rare cough  No upset stomach  No covid test  Lmp:  11/15/22  Not pregnant           Past Medical History:   Diagnosis Date   • Asthma    • Goiter     r/s 8/2016       History reviewed  No pertinent surgical history      Current Outpatient Medications   Medication Sig Dispense Refill   • amoxicillin (AMOXIL) 875 mg tablet Take 1 tablet (875 mg total) by mouth 2 (two) times a day for 10 days 20 tablet 0     No current facility-administered medications for this visit  Allergies   Allergen Reactions   • Dust Mite Extract Wheezing       Review of Systems   Constitutional: Positive for fatigue  Negative for fever  HENT: Positive for congestion, ear pain, postnasal drip, rhinorrhea and sore throat  Eyes: Negative for photophobia and visual disturbance  Respiratory: Positive for cough  Negative for shortness of breath and wheezing  Cardiovascular: Negative for chest pain and palpitations  Gastrointestinal: Negative for constipation, diarrhea, nausea and vomiting  Genitourinary: Negative for dysuria and frequency  Musculoskeletal: Positive for myalgias  Negative for arthralgias  Skin: Negative for rash  Neurological: Positive for headaches  Negative for dizziness and light-headedness  Hematological: Negative for adenopathy  Psychiatric/Behavioral: Negative for dysphoric mood and sleep disturbance  The patient is not nervous/anxious  Video Exam    Vitals:       Physical Exam  Constitutional:       Appearance: Normal appearance  HENT:      Head: Normocephalic and atraumatic  Eyes:      Conjunctiva/sclera: Conjunctivae normal    Pulmonary:      Effort: Pulmonary effort is normal    Musculoskeletal:         General: Normal range of motion  Cervical back: Normal range of motion  Neurological:      Mental Status: She is alert and oriented to person, place, and time     Psychiatric:         Mood and Affect: Mood normal          Behavior: Behavior normal           I spent 10 minutes with patient today in which greater than 50% of the time was spent in counseling/coordination of care regarding uri

## 2022-12-29 NOTE — LETTER
December 29, 2022     Patient: Mariajoes Mazariegos  YOB: 2000  Date of Visit: 12/29/2022      To Whom it May Concern:    Mariajose Mazariegos is under my professional care  Tan Ro was seen in my office on 12/29/2022  Tan Ro may return to work on 12/30/22  Unable to work today due to illness  If you have any questions or concerns, please don't hesitate to call           Sincerely,          DENNY Wilson        CC: No Recipients

## 2023-01-18 NOTE — TELEPHONE ENCOUNTER
Tried calling end of last month and was on hold for 40 mins had to hang up as we got busy in the office

## 2023-01-20 NOTE — TELEPHONE ENCOUNTER
Spoke to [employer] and they stated that the patient would have to have the surgery scheduled before we start a prior auth, as they need CPT codes, dx code, DOS and NPI number of surgeons office  And the surgeons office notes on why she needs the surgery  LM for patients mom

## 2023-04-05 ENCOUNTER — OFFICE VISIT (OUTPATIENT)
Dept: FAMILY MEDICINE CLINIC | Facility: CLINIC | Age: 23
End: 2023-04-05

## 2023-04-05 VITALS
WEIGHT: 185.6 LBS | HEART RATE: 133 BPM | BODY MASS INDEX: 37.42 KG/M2 | TEMPERATURE: 98.1 F | OXYGEN SATURATION: 99 % | HEIGHT: 59 IN | SYSTOLIC BLOOD PRESSURE: 102 MMHG | DIASTOLIC BLOOD PRESSURE: 66 MMHG

## 2023-04-05 DIAGNOSIS — N62 LARGE BREASTS: ICD-10-CM

## 2023-04-05 DIAGNOSIS — G89.29 CHRONIC BACK PAIN GREATER THAN 3 MONTHS DURATION: ICD-10-CM

## 2023-04-05 DIAGNOSIS — Z00.00 ANNUAL PHYSICAL EXAM: Primary | ICD-10-CM

## 2023-04-05 DIAGNOSIS — M54.9 CHRONIC BACK PAIN GREATER THAN 3 MONTHS DURATION: ICD-10-CM

## 2023-04-05 NOTE — PROGRESS NOTES
[address] [facility]    NAME: Catarino Baumgarten  AGE: 23 y o  SEX: female  : 2000     DATE: 2023     Assessment and Plan:   BMI Counseling: Body mass index is 37 49 kg/m²  The BMI is above normal  Nutrition recommendations include reducing portion sizes and consuming healthier snacks  PHQ-2/9 Depression Screening    Little interest or pleasure in doing things: 0 - not at all  Feeling down, depressed, or hopeless: 0 - not at all  PHQ-2 Score: 0  PHQ-2 Interpretation: Negative depression screen         Problem List Items Addressed This Visit     Large breasts   Other Visit Diagnoses     Annual physical exam    -  Primary    Chronic back pain greater than 3 months duration              Immunizations and preventive care screenings were discussed with patient today  Appropriate education was printed on patient's after visit summary  Counseling:  Alcohol/drug use: discussed moderation in alcohol intake, the recommendations for healthy alcohol use, and avoidance of illicit drug use  Dental Health: discussed importance of regular tooth brushing, flossing, and dental visits  Injury prevention: discussed safety/seat belts, safety helmets, smoke detectors, carbon dioxide detectors, and smoking near bedding or upholstery  Sexual health: discussed sexually transmitted diseases, partner selection, use of condoms, avoidance of unintended pregnancy, and contraceptive alternatives  · Exercise: the importance of regular exercise/physical activity was discussed  Recommend exercise 3-5 times per week for at least 30 minutes  Return if symptoms worsen or fail to improve  Chief Complaint:     Chief Complaint   Patient presents with   • Physical Exam   • Well Check      History of Present Illness:     Adult Annual Physical   Patient here for a comprehensive physical exam  The patient reports no problems      Diet and Physical Activity  · Diet/Nutrition: well balanced diet  · Exercise: walking  Depression Screening  PHQ-2/9 Depression Screening    Little interest or pleasure in doing things: 0 - not at all  Feeling down, depressed, or hopeless: 0 - not at all  PHQ-2 Score: 0  PHQ-2 Interpretation: Negative depression screen       General Health  · Sleep: gets 4-6 hours of sleep on average  · Hearing: normal - bilateral   · Vision: no vision problems  · Dental: no dental visits for >1 year  /GYN Health  · Last menstrual period: last month  · Contraceptive method: not sex active     · History of STDs?: no      Review of Systems:   Physical   Back pains - large breasts  Thinking about breast reduction since teenager  Review of Systems   Constitutional: Negative  HENT: Negative  Eyes: Negative  Respiratory: Negative  Cardiovascular: Negative  Gastrointestinal: Negative  Genitourinary: Negative  Musculoskeletal: Positive for back pain  Skin: Negative  Neurological: Negative  Psychiatric/Behavioral: Negative  Past Medical History:     Past Medical History:   Diagnosis Date   • Asthma    • Goiter     r/s 8/2016      Past Surgical History:     History reviewed  No pertinent surgical history     Social History:     Social History     Socioeconomic History   • Marital status: Single     Spouse name: None   • Number of children: None   • Years of education: None   • Highest education level: None   Occupational History   • None   Tobacco Use   • Smoking status: Never   • Smokeless tobacco: Never   Vaping Use   • Vaping Use: Never used   Substance and Sexual Activity   • Alcohol use: No   • Drug use: No   • Sexual activity: Not Currently   Other Topics Concern   • None   Social History Narrative   • None     Social Determinants of Health     Financial Resource Strain: Not on file   Food Insecurity: Not on file   Transportation Needs: Not on file   Physical Activity: Not on file   Stress: Not on "file   Social Connections: Not on file   Intimate Partner Violence: Not on file   Housing Stability: Not on file      Family History:     Family History   Problem Relation Age of Onset   • Arthritis Father    • Hypertension Father    • Diabetes Father    • Benign prostatic hyperplasia Father    • Arthritis Sister       Current Medications:     No current outpatient medications on file  No current facility-administered medications for this visit  Allergies: Allergies   Allergen Reactions   • Dust Mite Extract Wheezing      Physical Exam:     /66 (BP Location: Left arm, Patient Position: Sitting, Cuff Size: Standard)   Pulse (!) 133   Temp 98 1 °F (36 7 °C) (Temporal)   Ht 4' 11\" (1 499 m)   Wt 84 2 kg (185 lb 9 6 oz)   SpO2 99%   BMI 37 49 kg/m²     Physical Exam  Vitals and nursing note reviewed  Constitutional:       General: She is not in acute distress  Appearance: She is well-developed  HENT:      Head: Normocephalic and atraumatic  Right Ear: Tympanic membrane, ear canal and external ear normal       Left Ear: Tympanic membrane, ear canal and external ear normal       Mouth/Throat:      Mouth: Mucous membranes are moist       Pharynx: Oropharynx is clear  Eyes:      Conjunctiva/sclera: Conjunctivae normal       Pupils: Pupils are equal, round, and reactive to light  Cardiovascular:      Rate and Rhythm: Normal rate and regular rhythm  Pulses: Normal pulses  Heart sounds: Normal heart sounds  No murmur heard  Pulmonary:      Effort: Pulmonary effort is normal  No respiratory distress  Breath sounds: Normal breath sounds  Abdominal:      General: Abdomen is flat  Bowel sounds are normal       Palpations: Abdomen is soft  Tenderness: There is no abdominal tenderness  Musculoskeletal:         General: No swelling  Cervical back: Neck supple  Skin:     General: Skin is warm and dry        Capillary Refill: Capillary refill takes less than 2 " seconds  Neurological:      Mental Status: She is alert and oriented to person, place, and time  Psychiatric:         Mood and Affect: Mood normal          Behavior: Behavior normal          Thought Content:  Thought content normal          Judgment: Judgment normal           Mir Birch DO   [facility]

## 2023-09-26 ENCOUNTER — OFFICE VISIT (OUTPATIENT)
Dept: FAMILY MEDICINE CLINIC | Facility: CLINIC | Age: 23
End: 2023-09-26
Payer: MEDICARE

## 2023-09-26 VITALS
SYSTOLIC BLOOD PRESSURE: 114 MMHG | WEIGHT: 183.6 LBS | DIASTOLIC BLOOD PRESSURE: 62 MMHG | HEIGHT: 59 IN | OXYGEN SATURATION: 98 % | BODY MASS INDEX: 37.01 KG/M2 | HEART RATE: 112 BPM | TEMPERATURE: 98.2 F

## 2023-09-26 DIAGNOSIS — R11.0 NAUSEA: ICD-10-CM

## 2023-09-26 DIAGNOSIS — R19.7 DIARRHEA, UNSPECIFIED TYPE: Primary | ICD-10-CM

## 2023-09-26 LAB
SARS-COV-2 AG UPPER RESP QL IA: NEGATIVE
VALID CONTROL: NORMAL

## 2023-09-26 PROCEDURE — 99213 OFFICE O/P EST LOW 20 MIN: CPT | Performed by: FAMILY MEDICINE

## 2023-09-26 PROCEDURE — 87811 SARS-COV-2 COVID19 W/OPTIC: CPT | Performed by: FAMILY MEDICINE

## 2023-09-26 RX ORDER — ONDANSETRON 4 MG/1
4 TABLET, FILM COATED ORAL EVERY 12 HOURS PRN
Qty: 10 TABLET | Refills: 0 | Status: SHIPPED | OUTPATIENT
Start: 2023-09-26

## 2023-09-26 NOTE — PROGRESS NOTES
Name: Jacinto Peguero      : 2000      MRN: [id number]  Encounter Provider: Abhinav Palma DO  Encounter Date: 2023   Encounter department: [facility]    Assessment & Plan     Abdomen normal to exam - suspect diarrhea will resolve within 24 - 48 hours. Hydrate with Gatorade:Water 50:50, at least 2 leaters over next 24 hours. Chief Complaint   Patient presents with   • Diarrhea       1. Diarrhea, unspecified type  -     POCT Rapid Covid Ag  -     ondansetron (ZOFRAN) 4 mg tablet; Take 1 tablet (4 mg total) by mouth every 12 (twelve) hours as needed for nausea or vomiting    2. Nausea  -     ondansetron (ZOFRAN) 4 mg tablet; Take 1 tablet (4 mg total) by mouth every 12 (twelve) hours as needed for nausea or vomiting           Subjective     Diarrhea started Saturday evening, watery stools,  about 6 stools, Monday about 4 - 5 times, today 2X. Some nausea. Stools started about 2 hours after eating. Ate oatmeal today. Little nausea. Review of Systems   Constitutional: Negative. HENT: Negative. Respiratory: Negative. Cardiovascular: Negative. Gastrointestinal: Positive for diarrhea. Musculoskeletal: Negative. Skin: Negative. Neurological: Negative. Psychiatric/Behavioral: Negative. Past Medical History:   Diagnosis Date   • Asthma    • Goiter     r/s 2016     No past surgical history on file.   Family History   Problem Relation Age of Onset   • Arthritis Father    • Hypertension Father    • Diabetes Father    • Benign prostatic hyperplasia Father    • Arthritis Sister      Social History     Socioeconomic History   • Marital status: Single     Spouse name: None   • Number of children: None   • Years of education: None   • Highest education level: None   Occupational History   • None   Tobacco Use   • Smoking status: Never   • Smokeless tobacco: Never   Vaping Use   • Vaping Use: Never used   Substance and Sexual Activity   • Alcohol use: No   • Drug use: No   • Sexual activity: Not Currently   Other Topics Concern   • None   Social History Narrative   • None     Social Determinants of Health     Financial Resource Strain: Not on file   Food Insecurity: Not on file   Transportation Needs: Not on file   Physical Activity: Not on file   Stress: Not on file   Social Connections: Not on file   Intimate Partner Violence: Not on file   Housing Stability: Not on file     No current outpatient medications on file prior to visit. Allergies   Allergen Reactions   • Dust Mite Extract Wheezing     Immunization History   Administered Date(s) Administered   • DTaP 5 2002, 02/06/2003, 05/13/2003, 06/02/2004, 09/12/2007   • HPV9 08/02/2017, 08/02/2017, 08/02/2017, 08/12/2019, 02/21/2020   • Hep B, adult 2000, 02/06/2003, 10/09/2003   • Hib (PRP-OMP) 2002, 02/06/2003, 05/13/2003, 06/02/2004   • INFLUENZA 12/21/2010   • IPV 2002, 02/06/2003, 05/13/2003, 09/12/2007   • Influenza, injectable, quadrivalent, preservative free 0.5 mL 02/10/2020   • Influenza, seasonal, injectable 10/28/2005, 12/21/2010   • MMR 10/09/2003, 09/12/2007   • Meningococcal Conjugate (MCV4O) 10/28/2013   • Meningococcal MCV4P 02/10/2020   • Tdap 10/28/2013   • Varicella 10/09/2003, 09/12/2007       Objective     /62 (BP Location: Left arm, Patient Position: Sitting, Cuff Size: Large)   Pulse (!) 112   Temp 98.2 °F (36.8 °C) (Temporal)   Ht 4' 11" (1.499 m)   Wt 83.3 kg (183 lb 9.6 oz)   SpO2 98%   BMI 37.08 kg/m²     Physical Exam  Constitutional:       Appearance: She is well-developed. HENT:      Head: Normocephalic and atraumatic. Right Ear: Tympanic membrane, ear canal and external ear normal.      Left Ear: Tympanic membrane, ear canal and external ear normal.      Nose: Nose normal.      Mouth/Throat:      Mouth: Mucous membranes are moist.      Pharynx: Oropharynx is clear.    Eyes:      Conjunctiva/sclera: Conjunctivae normal.      Pupils: Pupils are equal, round, and reactive to light. Cardiovascular:      Rate and Rhythm: Normal rate and regular rhythm. Heart sounds: Normal heart sounds. Pulmonary:      Effort: Pulmonary effort is normal.      Breath sounds: Normal breath sounds. Abdominal:      General: Abdomen is flat. Bowel sounds are normal.      Palpations: Abdomen is soft. Tenderness: There is no abdominal tenderness. Musculoskeletal:      Cervical back: Normal range of motion and neck supple. Skin:     General: Skin is warm and dry. Neurological:      Mental Status: She is alert and oriented to person, place, and time. Deep Tendon Reflexes: Reflexes are normal and symmetric. Psychiatric:         Behavior: Behavior normal.         Thought Content:  Thought content normal.         Judgment: Judgment normal.       Jeanne Tran DO

## 2024-08-07 ENCOUNTER — HOSPITAL ENCOUNTER (EMERGENCY)
Facility: HOSPITAL | Age: 24
Discharge: HOME | End: 2024-08-08
Attending: EMERGENCY MEDICINE
Payer: MEDICARE

## 2024-08-07 VITALS
DIASTOLIC BLOOD PRESSURE: 74 MMHG | OXYGEN SATURATION: 100 % | SYSTOLIC BLOOD PRESSURE: 133 MMHG | BODY MASS INDEX: 37.85 KG/M2 | WEIGHT: 187.39 LBS | HEART RATE: 74 BPM | TEMPERATURE: 98.3 F | RESPIRATION RATE: 18 BRPM

## 2024-08-07 DIAGNOSIS — K52.9 GASTROENTERITIS: Primary | ICD-10-CM

## 2024-08-07 LAB
ALBUMIN SERPL BCG-MCNC: 4.2 G/DL (ref 3.5–5)
ALP SERPL-CCNC: 66 U/L (ref 34–104)
ALT SERPL W P-5'-P-CCNC: 17 U/L (ref 7–52)
ANION GAP SERPL CALCULATED.3IONS-SCNC: 9 MMOL/L (ref 4–13)
AST SERPL W P-5'-P-CCNC: 15 U/L (ref 13–39)
BASOPHILS # BLD AUTO: 0.05 THOUSANDS/ÂΜL (ref 0–0.1)
BASOPHILS NFR BLD AUTO: 0 % (ref 0–1)
BILIRUB SERPL-MCNC: 0.42 MG/DL (ref 0.2–1)
BILIRUB UR QL STRIP: NEGATIVE
BUN SERPL-MCNC: 9 MG/DL (ref 5–25)
CALCIUM SERPL-MCNC: 9.2 MG/DL (ref 8.4–10.2)
CHLORIDE SERPL-SCNC: 105 MMOL/L (ref 96–108)
CLARITY UR: CLEAR
CO2 SERPL-SCNC: 22 MMOL/L (ref 21–32)
COLOR UR: YELLOW
CREAT SERPL-MCNC: 0.68 MG/DL (ref 0.6–1.3)
EOSINOPHIL # BLD AUTO: 0 THOUSAND/ÂΜL (ref 0–0.61)
EOSINOPHIL NFR BLD AUTO: 0 % (ref 0–6)
ERYTHROCYTE [DISTWIDTH] IN BLOOD BY AUTOMATED COUNT: 14 % (ref 11.6–15.1)
EXT PREGNANCY TEST URINE: NEGATIVE
EXT. CONTROL: NORMAL
GFR SERPL CREATININE-BSD FRML MDRD: 125 ML/MIN/1.73SQ M
GLUCOSE SERPL-MCNC: 111 MG/DL (ref 65–140)
GLUCOSE UR STRIP-MCNC: NEGATIVE MG/DL
HCT VFR BLD AUTO: 36.8 % (ref 34.8–46.1)
HGB BLD-MCNC: 11.5 G/DL (ref 11.5–15.4)
HGB UR QL STRIP.AUTO: NEGATIVE
IMM GRANULOCYTES # BLD AUTO: 0.07 THOUSAND/UL (ref 0–0.2)
IMM GRANULOCYTES NFR BLD AUTO: 0 % (ref 0–2)
KETONES UR STRIP-MCNC: ABNORMAL MG/DL
LEUKOCYTE ESTERASE UR QL STRIP: NEGATIVE
LIPASE SERPL-CCNC: 10 U/L (ref 11–82)
LYMPHOCYTES # BLD AUTO: 1.64 THOUSANDS/ÂΜL (ref 0.6–4.47)
LYMPHOCYTES NFR BLD AUTO: 9 % (ref 14–44)
MCH RBC QN AUTO: 25.1 PG (ref 26.8–34.3)
MCHC RBC AUTO-ENTMCNC: 31.3 G/DL (ref 31.4–37.4)
MCV RBC AUTO: 80 FL (ref 82–98)
MONOCYTES # BLD AUTO: 0.51 THOUSAND/ÂΜL (ref 0.17–1.22)
MONOCYTES NFR BLD AUTO: 3 % (ref 4–12)
NEUTROPHILS # BLD AUTO: 15.49 THOUSANDS/ÂΜL (ref 1.85–7.62)
NEUTS SEG NFR BLD AUTO: 88 % (ref 43–75)
NITRITE UR QL STRIP: NEGATIVE
NRBC BLD AUTO-RTO: 0 /100 WBCS
PH UR STRIP.AUTO: 8 [PH] (ref 4.5–8)
PLATELET # BLD AUTO: 443 THOUSANDS/UL (ref 149–390)
PMV BLD AUTO: 9.4 FL (ref 8.9–12.7)
POTASSIUM SERPL-SCNC: 4.1 MMOL/L (ref 3.5–5.3)
PROT SERPL-MCNC: 7.5 G/DL (ref 6.4–8.4)
PROT UR STRIP-MCNC: ABNORMAL MG/DL
RBC # BLD AUTO: 4.58 MILLION/UL (ref 3.81–5.12)
SODIUM SERPL-SCNC: 136 MMOL/L (ref 135–147)
SP GR UR STRIP.AUTO: 1.02 (ref 1–1.03)
UROBILINOGEN UR QL STRIP.AUTO: 0.2 E.U./DL
WBC # BLD AUTO: 17.76 THOUSAND/UL (ref 4.31–10.16)

## 2024-08-07 PROCEDURE — 81001 URINALYSIS AUTO W/SCOPE: CPT

## 2024-08-07 PROCEDURE — 96361 HYDRATE IV INFUSION ADD-ON: CPT

## 2024-08-07 PROCEDURE — 99285 EMERGENCY DEPT VISIT HI MDM: CPT

## 2024-08-07 PROCEDURE — 96374 THER/PROPH/DIAG INJ IV PUSH: CPT

## 2024-08-07 PROCEDURE — 80053 COMPREHEN METABOLIC PANEL: CPT

## 2024-08-07 PROCEDURE — 81025 URINE PREGNANCY TEST: CPT

## 2024-08-07 PROCEDURE — 99284 EMERGENCY DEPT VISIT MOD MDM: CPT

## 2024-08-07 PROCEDURE — 83690 ASSAY OF LIPASE: CPT

## 2024-08-07 PROCEDURE — 36415 COLL VENOUS BLD VENIPUNCTURE: CPT

## 2024-08-07 PROCEDURE — 85025 COMPLETE CBC W/AUTO DIFF WBC: CPT

## 2024-08-07 PROCEDURE — 96375 TX/PRO/DX INJ NEW DRUG ADDON: CPT

## 2024-08-07 RX ORDER — ONDANSETRON 2 MG/ML
4 INJECTION INTRAMUSCULAR; INTRAVENOUS ONCE
Status: COMPLETED | OUTPATIENT
Start: 2024-08-07 | End: 2024-08-07

## 2024-08-07 RX ORDER — FAMOTIDINE 10 MG/ML
20 INJECTION, SOLUTION INTRAVENOUS ONCE
Status: COMPLETED | OUTPATIENT
Start: 2024-08-07 | End: 2024-08-07

## 2024-08-07 RX ADMIN — SODIUM CHLORIDE 1000 ML: 0.9 INJECTION, SOLUTION INTRAVENOUS at 23:21

## 2024-08-07 RX ADMIN — ONDANSETRON 4 MG: 2 INJECTION INTRAMUSCULAR; INTRAVENOUS at 23:22

## 2024-08-07 RX ADMIN — FAMOTIDINE 20 MG: 10 INJECTION, SOLUTION INTRAVENOUS at 23:24

## 2024-08-07 NOTE — Clinical Note
Aurelia Mccall was seen and treated in our emergency department on 8/7/2024.    No restrictions            Diagnosis:     Aurelia  may return to work on return date.    She may return on this date: 08/09/2024         If you have any questions or concerns, please don't hesitate to call.      Sharon Burk RN    ______________________________           _______________          _______________  Hospital Representative                              Date                                Time

## 2024-08-08 ENCOUNTER — APPOINTMENT (EMERGENCY)
Dept: CT IMAGING | Facility: HOSPITAL | Age: 24
End: 2024-08-08
Payer: MEDICARE

## 2024-08-08 LAB
AMORPH URATE CRY URNS QL MICRO: ABNORMAL
BACTERIA UR QL AUTO: ABNORMAL /HPF
NON-SQ EPI CELLS URNS QL MICRO: ABNORMAL /HPF
RBC #/AREA URNS AUTO: ABNORMAL /HPF
WBC #/AREA URNS AUTO: ABNORMAL /HPF

## 2024-08-08 PROCEDURE — 96361 HYDRATE IV INFUSION ADD-ON: CPT

## 2024-08-08 PROCEDURE — NC001 PR NO CHARGE

## 2024-08-08 PROCEDURE — 74177 CT ABD & PELVIS W/CONTRAST: CPT

## 2024-08-08 RX ORDER — FAMOTIDINE 20 MG/1
20 TABLET, FILM COATED ORAL 2 TIMES DAILY
Qty: 30 TABLET | Refills: 0 | Status: ON HOLD | OUTPATIENT
Start: 2024-08-08 | End: 2024-08-09 | Stop reason: SDDI

## 2024-08-08 RX ORDER — ONDANSETRON 4 MG/1
4 TABLET, ORALLY DISINTEGRATING ORAL EVERY 6 HOURS PRN
Qty: 15 TABLET | Refills: 0 | Status: SHIPPED | OUTPATIENT
Start: 2024-08-08 | End: 2024-08-08

## 2024-08-08 RX ORDER — DICYCLOMINE HCL 20 MG
20 TABLET ORAL 2 TIMES DAILY
Qty: 20 TABLET | Refills: 0 | Status: SHIPPED | OUTPATIENT
Start: 2024-08-08 | End: 2024-08-08

## 2024-08-08 RX ORDER — ONDANSETRON 4 MG/1
4 TABLET, ORALLY DISINTEGRATING ORAL EVERY 6 HOURS PRN
Qty: 15 TABLET | Refills: 0 | Status: SHIPPED | OUTPATIENT
Start: 2024-08-08 | End: 2024-08-09

## 2024-08-08 RX ORDER — FAMOTIDINE 20 MG/1
20 TABLET, FILM COATED ORAL 2 TIMES DAILY
Qty: 30 TABLET | Refills: 0 | Status: SHIPPED | OUTPATIENT
Start: 2024-08-08 | End: 2024-08-08

## 2024-08-08 RX ORDER — DICYCLOMINE HCL 20 MG
20 TABLET ORAL 2 TIMES DAILY
Qty: 20 TABLET | Refills: 0 | Status: ON HOLD | OUTPATIENT
Start: 2024-08-08 | End: 2024-08-09 | Stop reason: SDDI

## 2024-08-08 RX ADMIN — IOHEXOL 100 ML: 350 INJECTION, SOLUTION INTRAVENOUS at 00:37

## 2024-08-08 NOTE — DISCHARGE INSTRUCTIONS
Take Zofran as prescribed as needed for nausea/vomiting.  You do not have to take this if you are nausea/vomiting    Take Bentyl as prescribed as needed for abdominal pain    Take Pepcid as prescribed as needed for abdominal pain    Follow up with your PCP    Return for worsening pain, right lower abdominal pain, vomiting despite Zofran, chest pain, trouble breathing, or any other new/concerning symptoms     On CT: The appendix is at the upper limits of normal measuring 6 to 7 cm in diameter (series 601, image 79) however there are no significant periappendiceal inflammatory changes to suggest appendicitis.

## 2024-08-08 NOTE — ED PROVIDER NOTES
"History  Chief Complaint   Patient presents with    Abdominal Pain     Pt reports epigastric pain, vomiting, and diarrhea since 1200 today after eating [employer]     Aurelia is a 23-year-old female presenting to the emergency department with abdominal pain x 10 hours.  She reports that she has epigastric pain that is \"like fire\" in sensation.  Reports that she has had multiple episodes of vomiting and diarrhea today as well.  States that she ate [employer] earlier in the day.  Stating that it normally \"makes her sick\" but not to this extent.  Denies fevers, urinary and vaginal symptoms.       Abdominal Pain  Associated symptoms: no anorexia, no chest pain, no chills, no constipation, no cough, no diarrhea, no dysuria, no fatigue, no fever, no flatus, no hematemesis, no hematochezia, no hematuria, no melena, no nausea, no shortness of breath, no sore throat, no vaginal bleeding, no vaginal discharge and no vomiting        Prior to Admission Medications   Prescriptions Last Dose Informant Patient Reported? Taking?   ondansetron (ZOFRAN) 4 mg tablet   No No   Sig: Take 1 tablet (4 mg total) by mouth every 12 (twelve) hours as needed for nausea or vomiting      Facility-Administered Medications: None       Past Medical History:   Diagnosis Date    Asthma     Goiter     r/s 8/2016       History reviewed. No pertinent surgical history.    Family History   Problem Relation Age of Onset    Arthritis Father     Hypertension Father     Diabetes Father     Benign prostatic hyperplasia Father     Arthritis Sister      I have reviewed and agree with the history as documented.    E-Cigarette/Vaping    E-Cigarette Use Never User      E-Cigarette/Vaping Substances    Nicotine No     THC No     CBD No     Flavoring No     Other No     Unknown No      Social History     Tobacco Use    Smoking status: Never    Smokeless tobacco: Never   Vaping Use    Vaping status: Never Used   Substance Use Topics    Alcohol use: No    Drug use: No       Review " of Systems   Constitutional:  Negative for chills, fatigue and fever.   HENT:  Negative for sore throat.    Respiratory:  Negative for cough and shortness of breath.    Cardiovascular:  Negative for chest pain.   Gastrointestinal:  Positive for abdominal pain. Negative for anorexia, blood in stool, constipation, diarrhea, flatus, hematemesis, hematochezia, melena, nausea and vomiting.   Genitourinary:  Negative for dysuria, hematuria, vaginal bleeding and vaginal discharge.   Neurological:  Negative for light-headedness.   All other systems reviewed and are negative.      Physical Exam  Physical Exam  Vitals and nursing note reviewed.   Constitutional:       General: She is not in acute distress.     Appearance: She is well-developed.   HENT:      Head: Normocephalic and atraumatic.   Eyes:      Conjunctiva/sclera: Conjunctivae normal.   Cardiovascular:      Rate and Rhythm: Normal rate and regular rhythm.   Pulmonary:      Effort: Pulmonary effort is normal. No respiratory distress.      Breath sounds: Normal breath sounds.   Abdominal:      General: Bowel sounds are normal.      Palpations: Abdomen is soft.      Tenderness: There is abdominal tenderness. There is no right CVA tenderness, left CVA tenderness, guarding or rebound. Negative signs include Burns's sign, Rovsing's sign, McBurney's sign, psoas sign and obturator sign.      Hernia: No hernia is present.   Musculoskeletal:         General: No swelling.      Cervical back: Neck supple.   Skin:     General: Skin is warm and dry.      Capillary Refill: Capillary refill takes less than 2 seconds.   Neurological:      Mental Status: She is alert.   Psychiatric:         Mood and Affect: Mood normal.         Vital Signs  ED Triage Vitals   Temperature Pulse Respirations Blood Pressure SpO2   08/07/24 2219 08/07/24 2217 08/07/24 2217 08/07/24 2217 08/07/24 2217   98.3 °F (36.8 °C) 91 18 120/87 97 %      Temp Source Heart Rate Source Patient Position - Orthostatic  VS BP Location FiO2 (%)   08/07/24 2219 08/07/24 2217 08/07/24 2217 08/07/24 2217 --   Oral Monitor Sitting Right arm       Pain Score       08/07/24 2217       7           Vitals:    08/07/24 2217 08/07/24 2329 08/07/24 2341   BP: 120/87 133/74 133/74   Pulse: 91  74   Patient Position - Orthostatic VS: Sitting  Lying         Visual Acuity      ED Medications  Medications   sodium chloride 0.9 % bolus 1,000 mL (1,000 mL Intravenous New Bag 8/7/24 2321)   Famotidine (PF) (PEPCID) injection 20 mg (20 mg Intravenous Given 8/7/24 2324)   ondansetron (ZOFRAN) injection 4 mg (4 mg Intravenous Given 8/7/24 2322)   iohexol (OMNIPAQUE) 350 MG/ML injection (MULTI-DOSE) 100 mL (100 mL Intravenous Given 8/8/24 0037)       Diagnostic Studies  Results Reviewed       Procedure Component Value Units Date/Time    Urine Microscopic [[id number]]  (Abnormal) Collected: 08/07/24 2332    Lab Status: Final result Specimen: Urine, Clean Catch Updated: 08/08/24 0031     RBC, UA 1-2 /hpf      WBC, UA 2-4 /hpf      Epithelial Cells Occasional /hpf      Bacteria, UA Occasional /hpf      Amorphous Crystals, UA Occasional    Comprehensive metabolic panel [[id number]] Collected: 08/07/24 2320    Lab Status: Final result Specimen: Blood from Arm, Left Updated: 08/07/24 2356     Sodium 136 mmol/L      Potassium 4.1 mmol/L      Chloride 105 mmol/L      CO2 22 mmol/L      ANION GAP 9 mmol/L      BUN 9 mg/dL      Creatinine 0.68 mg/dL      Glucose 111 mg/dL      Calcium 9.2 mg/dL      AST 15 U/L      ALT 17 U/L      Alkaline Phosphatase 66 U/L      Total Protein 7.5 g/dL      Albumin 4.2 g/dL      Total Bilirubin 0.42 mg/dL      eGFR 125 ml/min/1.73sq m     Narrative:      National Kid[employer] Disease Foundation guidelines for Chronic Kidney Disease (CKD):     Stage 1 with normal or high GFR (GFR > 90 mL/min/1.73 square meters)    Stage 2 Mild CKD (GFR = 60-89 mL/min/1.73 square meters)    Stage 3A Moderate CKD (GFR = 45-59 mL/min/1.73 square meters)     Stage 3B Moderate CKD (GFR = 30-44 mL/min/1.73 square meters)    Stage 4 Severe CKD (GFR = 15-29 mL/min/1.73 square meters)    Stage 5 End Stage CKD (GFR <15 mL/min/1.73 square meters)  Note: GFR calculation is accurate only with a steady state creatinine    Lipase [[id number]]  (Abnormal) Collected: 08/07/24 2320    Lab Status: Final result Specimen: Blood from Arm, Left Updated: 08/07/24 2356     Lipase 10 u/L     CBC and differential [[id number]]  (Abnormal) Collected: 08/07/24 2320    Lab Status: Final result Specimen: Blood from Arm, Left Updated: 08/07/24 2334     WBC 17.76 Thousand/uL      RBC 4.58 Million/uL      Hemoglobin 11.5 g/dL      Hematocrit 36.8 %      MCV 80 fL      MCH 25.1 pg      MCHC 31.3 g/dL      RDW 14.0 %      MPV 9.4 fL      Platelets 443 Thousands/uL      nRBC 0 /100 WBCs      Segmented % 88 %      Immature Grans % 0 %      Lymphocytes % 9 %      Monocytes % 3 %      Eosinophils Relative 0 %      Basophils Relative 0 %      Absolute Neutrophils 15.49 Thousands/µL      Absolute Immature Grans 0.07 Thousand/uL      Absolute Lymphocytes 1.64 Thousands/µL      Absolute Monocytes 0.51 Thousand/µL      Eosinophils Absolute 0.00 Thousand/µL      Basophils Absolute 0.05 Thousands/µL     POCT pregnancy, urine [[id number]]  (Normal) Resulted: 08/07/24 2334    Lab Status: Final result Updated: 08/07/24 2334     EXT Preg Test, Ur Negative     Control Valid    Urine Macroscopic, POC [[id number]]  (Abnormal) Collected: 08/07/24 2332    Lab Status: Final result Specimen: Urine Updated: 08/07/24 2333     Color, UA Yellow     Clarity, UA Clear     pH, UA 8.0     Leukocytes, UA Negative     Nitrite, UA Negative     Protein, UA 30 (1+) mg/dl      Glucose, UA Negative mg/dl      Ketones, UA 80 (3+) mg/dl      Urobilinogen, UA 0.2 E.U./dl      Bilirubin, UA Negative     Occult Blood, UA Negative     Specific Gravity, UA 1.025    Narrative:      CLINITEK RESULT                   CT abdomen pelvis with contrast  "  Final Result by Aman Ramirez MD (08/08 0125)      No acute inflammatory process identified within the abdomen or pelvis.         Workstation performed: [id number]                    Procedures  Procedures         ED Course                                 SBIRT 22yo+      Flowsheet Row Most Recent Value   Initial Alcohol Screen: US AUDIT-C     1. How often do you have a drink containing alcohol? 0 Filed at: 08/07/2024 2219   2. How many drinks containing alcohol do you have on a typical day you are drinking?  0 Filed at: 08/07/2024 2219   3b. FEMALE Any Age, or MALE 65+: How often do you have 4 or more drinks on one occassion? 0 Filed at: 08/07/2024 2219   Audit-C Score 0 Filed at: 08/07/2024 2219   EITAN: How many times in the past year have you...    Used an illegal drug or used a prescription medication for non-medical reasons? Never Filed at: 08/07/2024 2219                      Medical Decision Making  DDx includes gastritis, UTI, pregnancy, cholecystitis, appendicitis.  Discussed attempting medications and obtaining labs.  Patient reported mild improvement following Zofran and Pepcid.  White count almost 18,000.  Discussed symptoms consistent with gastritis but persistent pain white count indicate evaluation.  Discussed conservative management with outpatient medications and returning with worsening symptoms versus CT.  Patient prefers to perform CT.  Signed out pending CT results to Ami Gimenez PA-C    Portions of the record may have been created with voice recognition software. Occasional use of the incorrect word or \"sound a like\" substitutions may have occurred due to the inherent limitations of voice recognition software. Read the chart carefully and recognize, using context, where substitutions have occurred.       Amount and/or Complexity of Data Reviewed  Labs: ordered.  Radiology: ordered.    Risk  Prescription drug management.                 Disposition  Final diagnoses:   Gastroenteritis "     Time reflects when diagnosis was documented in both MDM as applicable and the Disposition within this note       Time User Action Codes Description Comment    8/8/2024  1:29 AM Ami Gimenez Add [K52.9] Gastroenteritis           ED Disposition       ED Disposition   Discharge    Condition   Stable    Date/Time   Thu Aug 8, 2024 0129    Comment   Aureliaricci Ariasino discharge to home/self care.                   Follow-up Information       Follow up With Specialties Details Why Contact Info    Kenji Recinos [address]  [place] [address]  [phone]              Current Discharge Medication List        START taking these medications    Details   dicyclomine (BENTYL) 20 mg tablet Take 1 tablet (20 mg total) by mouth 2 (two) times a day  Qty: 20 tablet, Refills: 0    Associated Diagnoses: Gastroenteritis      famotidine (PEPCID) 20 mg tablet Take 1 tablet (20 mg total) by mouth 2 (two) times a day  Qty: 30 tablet, Refills: 0    Associated Diagnoses: Gastroenteritis      ondansetron (ZOFRAN-ODT) 4 mg disintegrating tablet Take 1 tablet (4 mg total) by mouth every 6 (six) hours as needed for nausea or vomiting  Qty: 15 tablet, Refills: 0    Associated Diagnoses: Gastroenteritis           CONTINUE these medications which have NOT CHANGED    Details   ondansetron (ZOFRAN) 4 mg tablet Take 1 tablet (4 mg total) by mouth every 12 (twelve) hours as needed for nausea or vomiting  Qty: 10 tablet, Refills: 0    Associated Diagnoses: Diarrhea, unspecified type; Nausea             No discharge procedures on file.    PDMP Review       None            ED Provider  Electronically Signed by             Humaira Louis PA-C  08/08/24 0149

## 2024-08-08 NOTE — ED PROVIDER NOTES
Emergency Department Sign Out Note        Sign out and transfer of care from Humaira Louis PA-C. See Separate Emergency Department note.     The patient, Aurelia Mccall, was evaluated by the previous provider for abdominal pain, nausea, vomiting, and diarrhea.    Workup Completed:  Results Reviewed       Procedure Component Value Units Date/Time    Urine Microscopic [[id number]]  (Abnormal) Collected: 08/07/24 2332    Lab Status: Final result Specimen: Urine, Clean Catch Updated: 08/08/24 0031     RBC, UA 1-2 /hpf      WBC, UA 2-4 /hpf      Epithelial Cells Occasional /hpf      Bacteria, UA Occasional /hpf      Amorphous Crystals, UA Occasional    Comprehensive metabolic panel [[id number]] Collected: 08/07/24 2320    Lab Status: Final result Specimen: Blood from Arm, Left Updated: 08/07/24 2356     Sodium 136 mmol/L      Potassium 4.1 mmol/L      Chloride 105 mmol/L      CO2 22 mmol/L      ANION GAP 9 mmol/L      BUN 9 mg/dL      Creatinine 0.68 mg/dL      Glucose 111 mg/dL      Calcium 9.2 mg/dL      AST 15 U/L      ALT 17 U/L      Alkaline Phosphatase 66 U/L      Total Protein 7.5 g/dL      Albumin 4.2 g/dL      Total Bilirubin 0.42 mg/dL      eGFR 125 ml/min/1.73sq m     Narrative:      National Kid[employer] Foundation guidelines for Chronic Kidney Disease (CKD):     Stage 1 with normal or high GFR (GFR > 90 mL/min/1.73 square meters)    Stage 2 Mild CKD (GFR = 60-89 mL/min/1.73 square meters)    Stage 3A Moderate CKD (GFR = 45-59 mL/min/1.73 square meters)    Stage 3B Moderate CKD (GFR = 30-44 mL/min/1.73 square meters)    Stage 4 Severe CKD (GFR = 15-29 mL/min/1.73 square meters)    Stage 5 End Stage CKD (GFR <15 mL/min/1.73 square meters)  Note: GFR calculation is accurate only with a steady state creatinine    Lipase [[id number]]  (Abnormal) Collected: 08/07/24 2320    Lab Status: Final result Specimen: Blood from Arm, Left Updated: 08/07/24 2356     Lipase 10 u/L     CBC and differential [[id number]]   (Abnormal) Collected: 08/07/24 2320    Lab Status: Final result Specimen: Blood from Arm, Left Updated: 08/07/24 2334     WBC 17.76 Thousand/uL      RBC 4.58 Million/uL      Hemoglobin 11.5 g/dL      Hematocrit 36.8 %      MCV 80 fL      MCH 25.1 pg      MCHC 31.3 g/dL      RDW 14.0 %      MPV 9.4 fL      Platelets 443 Thousands/uL      nRBC 0 /100 WBCs      Segmented % 88 %      Immature Grans % 0 %      Lymphocytes % 9 %      Monocytes % 3 %      Eosinophils Relative 0 %      Basophils Relative 0 %      Absolute Neutrophils 15.49 Thousands/µL      Absolute Immature Grans 0.07 Thousand/uL      Absolute Lymphocytes 1.64 Thousands/µL      Absolute Monocytes 0.51 Thousand/µL      Eosinophils Absolute 0.00 Thousand/µL      Basophils Absolute 0.05 Thousands/µL     POCT pregnancy, urine [[id number]]  (Normal) Resulted: 08/07/24 2334    Lab Status: Final result Updated: 08/07/24 2334     EXT Preg Test, Ur Negative     Control Valid    Urine Macroscopic, POC [[id number]]  (Abnormal) Collected: 08/07/24 2332    Lab Status: Final result Specimen: Urine Updated: 08/07/24 2333     Color, UA Yellow     Clarity, UA Clear     pH, UA 8.0     Leukocytes, UA Negative     Nitrite, UA Negative     Protein, UA 30 (1+) mg/dl      Glucose, UA Negative mg/dl      Ketones, UA 80 (3+) mg/dl      Urobilinogen, UA 0.2 E.U./dl      Bilirubin, UA Negative     Occult Blood, UA Negative     Specific Gravity, UA 1.025    Narrative:      CLINITEK RESULT            CT abdomen pelvis with contrast    Result Date: 8/8/2024  Narrative: CT ABDOMEN AND PELVIS WITH IV CONTRAST INDICATION: Periumbilical/epigastric pain. WBC 18. COMPARISON: CT of the abdomen pelvis November 1, 2020. TECHNIQUE: CT examination of the abdomen and pelvis was performed. Multiplanar 2D reformatted images were created from the source data. This examination, like all CT scans performed in the [facility], was performed utilizing techniques to minimize radiation dose  exposure, including the use of iterative reconstruction and automated exposure control. Radiation dose length product (DLP) for this visit: 537.67 mGy-cm IV Contrast: 100 mL of iohexol (OMNIPAQUE) Enteric Contrast: Not administered. FINDINGS: ABDOMEN LOWER CHEST: No clinically significant abnormality in the visualized lower chest. LIVER/BILIARY TREE: Unremarkable. GALLBLADDER: No calcified gallstones. No pericholecystic inflammatory change. SPLEEN: Unremarkable. PANCREAS: Unremarkable. ADRENAL GLANDS: Unremarkable. KIDNEYS/URETERS: Unremarkable. No hydronephrosis. STOMACH AND BOWEL: No bowel obstruction. APPENDIX: The appendix is at the upper limits of normal measuring 6 to 7 cm in diameter (series 601, image 79) however there are no significant periappendiceal inflammatory changes to suggest appendicitis. ABDOMINOPELVIC CAVITY: No ascites. No pneumoperitoneum. No lymphadenopathy. VESSELS: Unremarkable for patient's age. PELVIS REPRODUCTIVE ORGANS: Unremarkable for patient's age. URINARY BLADDER: Unremarkable. ABDOMINAL WALL/INGUINAL REGIONS: Unremarkable. BONES: No acute fracture or suspicious osseous lesion.     Impression: No acute inflammatory process identified within the abdomen or pelvis. Workstation performed: [id number]        ED Course / Workup Pending (followup):       Procedures  Medical Decision Making  At the time of discharge, the patient is in no acute distress. I discussed with the patient the diagnosis, treatment plan, follow-up, return precautions, and discharge instructions; they were given the opportunity to ask questions and verbalized understanding.    Problems Addressed:  Gastroenteritis: acute illness or injury    Amount and/or Complexity of Data Reviewed  External Data Reviewed: labs, radiology and notes.  Labs: ordered. Decision-making details documented in ED Course.  Radiology: ordered. Decision-making details documented in ED Course.    Risk  Prescription drug  management.            Disposition  Final diagnoses:   Gastroenteritis     Time reflects when diagnosis was documented in both MDM as applicable and the Disposition within this note       Time User Action Codes Description Comment    8/8/2024  1:29 AM Ami Gimenez [K52.9] Gastroenteritis           ED Disposition       ED Disposition   Discharge    Condition   Stable    Date/Time   Thu Aug 8, 2024  1:29 AM    Comment   Aurelia Mccall discharge to home/self care.                   Follow-up Information       Follow up With Specialties Details Why Contact Info    Kenji Recinos DO [place] [address] [facility]  [place] [address]  [phone]            Patient's Medications   Discharge Prescriptions    DICYCLOMINE (BENTYL) 20 MG TABLET    Take 1 tablet (20 mg total) by mouth 2 (two) times a day       Start Date: 8/8/2024  End Date: --       Order Dose: 20 mg       Quantity: 20 tablet    Refills: 0    FAMOTIDINE (PEPCID) 20 MG TABLET    Take 1 tablet (20 mg total) by mouth 2 (two) times a day       Start Date: 8/8/2024  End Date: --       Order Dose: 20 mg       Quantity: 30 tablet    Refills: 0    ONDANSETRON (ZOFRAN-ODT) 4 MG DISINTEGRATING TABLET    Take 1 tablet (4 mg total) by mouth every 6 (six) hours as needed for nausea or vomiting       Start Date: 8/8/2024  End Date: --       Order Dose: 4 mg       Quantity: 15 tablet    Refills: 0     No discharge procedures on file.       ED Provider  Electronically Signed by     Ami Gimenez PA-C  08/08/24 0328

## 2024-08-09 ENCOUNTER — HOSPITAL ENCOUNTER (OUTPATIENT)
Facility: HOSPITAL | Age: 24
Setting detail: OUTPATIENT SURGERY
Discharge: HOME | End: 2024-08-09
Admitting: SURGERY
Payer: COMMERCIAL

## 2024-08-09 ENCOUNTER — ANESTHESIA EVENT (INPATIENT)
Dept: PERIOP | Facility: HOSPITAL | Age: 24
End: 2024-08-09
Payer: COMMERCIAL

## 2024-08-09 ENCOUNTER — ANESTHESIA (INPATIENT)
Dept: PERIOP | Facility: HOSPITAL | Age: 24
End: 2024-08-09
Payer: COMMERCIAL

## 2024-08-09 ENCOUNTER — APPOINTMENT (EMERGENCY)
Dept: CT IMAGING | Facility: HOSPITAL | Age: 24
End: 2024-08-09
Payer: COMMERCIAL

## 2024-08-09 VITALS
BODY MASS INDEX: 36.89 KG/M2 | RESPIRATION RATE: 16 BRPM | HEIGHT: 59 IN | TEMPERATURE: 98.3 F | WEIGHT: 183 LBS | DIASTOLIC BLOOD PRESSURE: 66 MMHG | HEART RATE: 90 BPM | OXYGEN SATURATION: 95 % | SYSTOLIC BLOOD PRESSURE: 108 MMHG

## 2024-08-09 DIAGNOSIS — K35.80 ACUTE APPENDICITIS: ICD-10-CM

## 2024-08-09 DIAGNOSIS — K35.30 ACUTE APPENDICITIS WITH LOCALIZED PERITONITIS, UNSPECIFIED WHETHER ABSCESS PRESENT, UNSPECIFIED WHETHER GANGRENE PRESENT, UNSPECIFIED WHETHER PERFORATION PRESENT: Primary | ICD-10-CM

## 2024-08-09 LAB
ALBUMIN SERPL BCG-MCNC: 4.2 G/DL (ref 3.5–5)
ALP SERPL-CCNC: 64 U/L (ref 34–104)
ALT SERPL W P-5'-P-CCNC: 13 U/L (ref 7–52)
ANION GAP SERPL CALCULATED.3IONS-SCNC: 8 MMOL/L (ref 4–13)
APTT PPP: 32 SECONDS (ref 23–34)
AST SERPL W P-5'-P-CCNC: 14 U/L (ref 13–39)
BASOPHILS # BLD AUTO: 0.03 THOUSANDS/ÂΜL (ref 0–0.1)
BASOPHILS NFR BLD AUTO: 0 % (ref 0–1)
BILIRUB DIRECT SERPL-MCNC: 0.15 MG/DL (ref 0–0.2)
BILIRUB SERPL-MCNC: 0.62 MG/DL (ref 0.2–1)
BILIRUB UR QL STRIP: NEGATIVE
BUN SERPL-MCNC: 7 MG/DL (ref 5–25)
CALCIUM SERPL-MCNC: 9.3 MG/DL (ref 8.4–10.2)
CHLORIDE SERPL-SCNC: 105 MMOL/L (ref 96–108)
CLARITY UR: CLEAR
CO2 SERPL-SCNC: 23 MMOL/L (ref 21–32)
COLOR UR: COLORLESS
CREAT SERPL-MCNC: 0.73 MG/DL (ref 0.6–1.3)
EOSINOPHIL # BLD AUTO: 0.02 THOUSAND/ÂΜL (ref 0–0.61)
EOSINOPHIL NFR BLD AUTO: 0 % (ref 0–6)
ERYTHROCYTE [DISTWIDTH] IN BLOOD BY AUTOMATED COUNT: 14.2 % (ref 11.6–15.1)
EXT PREGNANCY TEST URINE: NEGATIVE
EXT. CONTROL: NORMAL
GFR SERPL CREATININE-BSD FRML MDRD: 118 ML/MIN/1.73SQ M
GLUCOSE SERPL-MCNC: 97 MG/DL (ref 65–140)
GLUCOSE UR STRIP-MCNC: NEGATIVE MG/DL
HCT VFR BLD AUTO: 38.4 % (ref 34.8–46.1)
HGB BLD-MCNC: 12 G/DL (ref 11.5–15.4)
HGB UR QL STRIP.AUTO: NEGATIVE
IMM GRANULOCYTES # BLD AUTO: 0.1 THOUSAND/UL (ref 0–0.2)
IMM GRANULOCYTES NFR BLD AUTO: 1 % (ref 0–2)
INR PPP: 1.13 (ref 0.85–1.19)
KETONES UR STRIP-MCNC: ABNORMAL MG/DL
LACTATE SERPL-SCNC: 0.6 MMOL/L (ref 0.5–2)
LEUKOCYTE ESTERASE UR QL STRIP: NEGATIVE
LIPASE SERPL-CCNC: 21 U/L (ref 11–82)
LYMPHOCYTES # BLD AUTO: 1.76 THOUSANDS/ÂΜL (ref 0.6–4.47)
LYMPHOCYTES NFR BLD AUTO: 9 % (ref 14–44)
MCH RBC QN AUTO: 25.4 PG (ref 26.8–34.3)
MCHC RBC AUTO-ENTMCNC: 31.3 G/DL (ref 31.4–37.4)
MCV RBC AUTO: 81 FL (ref 82–98)
MONOCYTES # BLD AUTO: 1.16 THOUSAND/ÂΜL (ref 0.17–1.22)
MONOCYTES NFR BLD AUTO: 6 % (ref 4–12)
NEUTROPHILS # BLD AUTO: 15.66 THOUSANDS/ÂΜL (ref 1.85–7.62)
NEUTS SEG NFR BLD AUTO: 84 % (ref 43–75)
NITRITE UR QL STRIP: NEGATIVE
NRBC BLD AUTO-RTO: 0 /100 WBCS
PH UR STRIP.AUTO: 5.5 [PH]
PLATELET # BLD AUTO: 436 THOUSANDS/UL (ref 149–390)
PMV BLD AUTO: 9.3 FL (ref 8.9–12.7)
POTASSIUM SERPL-SCNC: 4.2 MMOL/L (ref 3.5–5.3)
PROCALCITONIN SERPL-MCNC: <0.05 NG/ML
PROT SERPL-MCNC: 7.5 G/DL (ref 6.4–8.4)
PROT UR STRIP-MCNC: NEGATIVE MG/DL
PROTHROMBIN TIME: 14.7 SECONDS (ref 12.3–15)
RBC # BLD AUTO: 4.72 MILLION/UL (ref 3.81–5.12)
SODIUM SERPL-SCNC: 136 MMOL/L (ref 135–147)
SP GR UR STRIP.AUTO: >=1.05 (ref 1–1.03)
UROBILINOGEN UR STRIP-ACNC: <2 MG/DL
WBC # BLD AUTO: 18.73 THOUSAND/UL (ref 4.31–10.16)

## 2024-08-09 PROCEDURE — 74177 CT ABD & PELVIS W/CONTRAST: CPT

## 2024-08-09 PROCEDURE — 83605 ASSAY OF LACTIC ACID: CPT

## 2024-08-09 PROCEDURE — 85610 PROTHROMBIN TIME: CPT

## 2024-08-09 PROCEDURE — 81025 URINE PREGNANCY TEST: CPT

## 2024-08-09 PROCEDURE — 44970 LAPAROSCOPY APPENDECTOMY: CPT | Performed by: SURGERY

## 2024-08-09 PROCEDURE — 81003 URINALYSIS AUTO W/O SCOPE: CPT

## 2024-08-09 PROCEDURE — 36415 COLL VENOUS BLD VENIPUNCTURE: CPT

## 2024-08-09 PROCEDURE — 87040 BLOOD CULTURE FOR BACTERIA: CPT

## 2024-08-09 PROCEDURE — 84145 PROCALCITONIN (PCT): CPT

## 2024-08-09 PROCEDURE — 96361 HYDRATE IV INFUSION ADD-ON: CPT

## 2024-08-09 PROCEDURE — 80048 BASIC METABOLIC PNL TOTAL CA: CPT

## 2024-08-09 PROCEDURE — 88304 TISSUE EXAM BY PATHOLOGIST: CPT | Performed by: PATHOLOGY

## 2024-08-09 PROCEDURE — 96365 THER/PROPH/DIAG IV INF INIT: CPT

## 2024-08-09 PROCEDURE — 85730 THROMBOPLASTIN TIME PARTIAL: CPT

## 2024-08-09 PROCEDURE — 96375 TX/PRO/DX INJ NEW DRUG ADDON: CPT

## 2024-08-09 PROCEDURE — 99291 CRITICAL CARE FIRST HOUR: CPT

## 2024-08-09 PROCEDURE — 80076 HEPATIC FUNCTION PANEL: CPT

## 2024-08-09 PROCEDURE — 83690 ASSAY OF LIPASE: CPT

## 2024-08-09 PROCEDURE — 99284 EMERGENCY DEPT VISIT MOD MDM: CPT

## 2024-08-09 PROCEDURE — 44970 LAPAROSCOPY APPENDECTOMY: CPT | Performed by: PHYSICIAN ASSISTANT

## 2024-08-09 PROCEDURE — 85025 COMPLETE CBC W/AUTO DIFF WBC: CPT

## 2024-08-09 PROCEDURE — 99223 1ST HOSP IP/OBS HIGH 75: CPT | Performed by: SURGERY

## 2024-08-09 RX ORDER — LIDOCAINE HYDROCHLORIDE 20 MG/ML
INJECTION, SOLUTION EPIDURAL; INFILTRATION; INTRACAUDAL; PERINEURAL AS NEEDED
Status: DISCONTINUED | OUTPATIENT
Start: 2024-08-09 | End: 2024-08-09

## 2024-08-09 RX ORDER — ROCURONIUM BROMIDE 10 MG/ML
INJECTION, SOLUTION INTRAVENOUS AS NEEDED
Status: DISCONTINUED | OUTPATIENT
Start: 2024-08-09 | End: 2024-08-09

## 2024-08-09 RX ORDER — MIDAZOLAM HYDROCHLORIDE 2 MG/2ML
INJECTION, SOLUTION INTRAMUSCULAR; INTRAVENOUS AS NEEDED
Status: DISCONTINUED | OUTPATIENT
Start: 2024-08-09 | End: 2024-08-09

## 2024-08-09 RX ORDER — KETOROLAC TROMETHAMINE 30 MG/ML
15 INJECTION, SOLUTION INTRAMUSCULAR; INTRAVENOUS ONCE
Status: COMPLETED | OUTPATIENT
Start: 2024-08-09 | End: 2024-08-09

## 2024-08-09 RX ORDER — HYDROMORPHONE HCL/PF 1 MG/ML
1 SYRINGE (ML) INJECTION EVERY 4 HOURS PRN
Status: DISCONTINUED | OUTPATIENT
Start: 2024-08-09 | End: 2024-08-09 | Stop reason: HOSPADM

## 2024-08-09 RX ORDER — BUPIVACAINE HYDROCHLORIDE AND EPINEPHRINE 2.5; 5 MG/ML; UG/ML
INJECTION, SOLUTION EPIDURAL; INFILTRATION; INTRACAUDAL; PERINEURAL AS NEEDED
Status: DISCONTINUED | OUTPATIENT
Start: 2024-08-09 | End: 2024-08-09 | Stop reason: HOSPADM

## 2024-08-09 RX ORDER — ONDANSETRON 2 MG/ML
4 INJECTION INTRAMUSCULAR; INTRAVENOUS ONCE AS NEEDED
Status: DISCONTINUED | OUTPATIENT
Start: 2024-08-09 | End: 2024-08-09 | Stop reason: HOSPADM

## 2024-08-09 RX ORDER — ONDANSETRON 2 MG/ML
4 INJECTION INTRAMUSCULAR; INTRAVENOUS EVERY 6 HOURS PRN
Status: DISCONTINUED | OUTPATIENT
Start: 2024-08-09 | End: 2024-08-09 | Stop reason: HOSPADM

## 2024-08-09 RX ORDER — METRONIDAZOLE 500 MG/100ML
500 INJECTION, SOLUTION INTRAVENOUS ONCE
Status: COMPLETED | OUTPATIENT
Start: 2024-08-09 | End: 2024-08-09

## 2024-08-09 RX ORDER — FENTANYL CITRATE 50 UG/ML
INJECTION, SOLUTION INTRAMUSCULAR; INTRAVENOUS AS NEEDED
Status: DISCONTINUED | OUTPATIENT
Start: 2024-08-09 | End: 2024-08-09

## 2024-08-09 RX ORDER — ONDANSETRON 2 MG/ML
INJECTION INTRAMUSCULAR; INTRAVENOUS AS NEEDED
Status: DISCONTINUED | OUTPATIENT
Start: 2024-08-09 | End: 2024-08-09

## 2024-08-09 RX ORDER — PHENYLEPHRINE HCL IN 0.9% NACL 1 MG/10 ML
SYRINGE (ML) INTRAVENOUS AS NEEDED
Status: DISCONTINUED | OUTPATIENT
Start: 2024-08-09 | End: 2024-08-09

## 2024-08-09 RX ORDER — OXYCODONE AND ACETAMINOPHEN 5; 325 MG/1; MG/1
1 TABLET ORAL EVERY 4 HOURS PRN
Status: DISCONTINUED | OUTPATIENT
Start: 2024-08-09 | End: 2024-08-09 | Stop reason: HOSPADM

## 2024-08-09 RX ORDER — OXYCODONE AND ACETAMINOPHEN 5; 325 MG/1; MG/1
2 TABLET ORAL EVERY 6 HOURS PRN
Status: DISCONTINUED | OUTPATIENT
Start: 2024-08-09 | End: 2024-08-09 | Stop reason: HOSPADM

## 2024-08-09 RX ORDER — KETOROLAC TROMETHAMINE 30 MG/ML
INJECTION, SOLUTION INTRAMUSCULAR; INTRAVENOUS AS NEEDED
Status: DISCONTINUED | OUTPATIENT
Start: 2024-08-09 | End: 2024-08-09

## 2024-08-09 RX ORDER — MAGNESIUM HYDROXIDE 1200 MG/15ML
LIQUID ORAL AS NEEDED
Status: DISCONTINUED | OUTPATIENT
Start: 2024-08-09 | End: 2024-08-09 | Stop reason: HOSPADM

## 2024-08-09 RX ORDER — SUCCINYLCHOLINE/SOD CL,ISO/PF 100 MG/5ML
SYRINGE (ML) INTRAVENOUS AS NEEDED
Status: DISCONTINUED | OUTPATIENT
Start: 2024-08-09 | End: 2024-08-09

## 2024-08-09 RX ORDER — CEFAZOLIN SODIUM 2 G/50ML
2000 SOLUTION INTRAVENOUS ONCE
Status: COMPLETED | OUTPATIENT
Start: 2024-08-09 | End: 2024-08-09

## 2024-08-09 RX ORDER — DEXAMETHASONE SODIUM PHOSPHATE 10 MG/ML
INJECTION, SOLUTION INTRAMUSCULAR; INTRAVENOUS AS NEEDED
Status: DISCONTINUED | OUTPATIENT
Start: 2024-08-09 | End: 2024-08-09

## 2024-08-09 RX ORDER — OXYCODONE HYDROCHLORIDE 5 MG/1
5 TABLET ORAL EVERY 4 HOURS PRN
Qty: 12 TABLET | Refills: 0 | Status: SHIPPED | OUTPATIENT
Start: 2024-08-09 | End: 2024-08-19

## 2024-08-09 RX ORDER — ACETAMINOPHEN 10 MG/ML
1000 INJECTION, SOLUTION INTRAVENOUS ONCE
Status: COMPLETED | OUTPATIENT
Start: 2024-08-09 | End: 2024-08-09

## 2024-08-09 RX ORDER — SODIUM CHLORIDE 9 MG/ML
125 INJECTION, SOLUTION INTRAVENOUS CONTINUOUS
Status: CANCELLED | OUTPATIENT
Start: 2024-08-09

## 2024-08-09 RX ORDER — HYDROMORPHONE HCL/PF 1 MG/ML
0.5 SYRINGE (ML) INJECTION
Status: DISCONTINUED | OUTPATIENT
Start: 2024-08-09 | End: 2024-08-09 | Stop reason: HOSPADM

## 2024-08-09 RX ORDER — PROPOFOL 10 MG/ML
INJECTION, EMULSION INTRAVENOUS AS NEEDED
Status: DISCONTINUED | OUTPATIENT
Start: 2024-08-09 | End: 2024-08-09

## 2024-08-09 RX ORDER — ONDANSETRON 2 MG/ML
4 INJECTION INTRAMUSCULAR; INTRAVENOUS ONCE
Status: COMPLETED | OUTPATIENT
Start: 2024-08-09 | End: 2024-08-09

## 2024-08-09 RX ORDER — HYDROMORPHONE HCL IN WATER/PF 6 MG/30 ML
0.2 PATIENT CONTROLLED ANALGESIA SYRINGE INTRAVENOUS
Status: DISCONTINUED | OUTPATIENT
Start: 2024-08-09 | End: 2024-08-09 | Stop reason: HOSPADM

## 2024-08-09 RX ORDER — HYDROMORPHONE HCL/PF 1 MG/ML
0.5 SYRINGE (ML) INJECTION EVERY 4 HOURS PRN
Status: DISCONTINUED | OUTPATIENT
Start: 2024-08-09 | End: 2024-08-09 | Stop reason: HOSPADM

## 2024-08-09 RX ORDER — PROPOFOL 10 MG/ML
INJECTION, EMULSION INTRAVENOUS CONTINUOUS PRN
Status: DISCONTINUED | OUTPATIENT
Start: 2024-08-09 | End: 2024-08-09

## 2024-08-09 RX ORDER — SODIUM CHLORIDE, SODIUM LACTATE, POTASSIUM CHLORIDE, CALCIUM CHLORIDE 600; 310; 30; 20 MG/100ML; MG/100ML; MG/100ML; MG/100ML
125 INJECTION, SOLUTION INTRAVENOUS CONTINUOUS
Status: DISCONTINUED | OUTPATIENT
Start: 2024-08-09 | End: 2024-08-09 | Stop reason: HOSPADM

## 2024-08-09 RX ADMIN — CEFAZOLIN SODIUM 2000 MG: 2 SOLUTION INTRAVENOUS at 11:17

## 2024-08-09 RX ADMIN — SODIUM CHLORIDE 4 MCG: 9 INJECTION, SOLUTION INTRAVENOUS at 12:17

## 2024-08-09 RX ADMIN — DEXAMETHASONE SODIUM PHOSPHATE 10 MG: 10 INJECTION INTRAMUSCULAR; INTRAVENOUS at 11:55

## 2024-08-09 RX ADMIN — SODIUM CHLORIDE 8 MCG: 9 INJECTION, SOLUTION INTRAVENOUS at 11:55

## 2024-08-09 RX ADMIN — ROCURONIUM BROMIDE 5 MG: 10 INJECTION, SOLUTION INTRAVENOUS at 11:47

## 2024-08-09 RX ADMIN — Medication 100 MG: at 11:47

## 2024-08-09 RX ADMIN — SODIUM CHLORIDE, SODIUM LACTATE, POTASSIUM CHLORIDE, AND CALCIUM CHLORIDE 125 ML/HR: .6; .31; .03; .02 INJECTION, SOLUTION INTRAVENOUS at 11:18

## 2024-08-09 RX ADMIN — KETOROLAC TROMETHAMINE 15 MG: 30 INJECTION, SOLUTION INTRAMUSCULAR; INTRAVENOUS at 12:30

## 2024-08-09 RX ADMIN — LIDOCAINE HYDROCHLORIDE 100 MG: 20 INJECTION, SOLUTION EPIDURAL; INFILTRATION; INTRACAUDAL at 11:47

## 2024-08-09 RX ADMIN — FENTANYL CITRATE 100 MCG: 50 INJECTION INTRAMUSCULAR; INTRAVENOUS at 11:47

## 2024-08-09 RX ADMIN — PIPERACILLIN AND TAZOBACTAM 4.5 G: 36; 4.5 INJECTION, POWDER, FOR SOLUTION INTRAVENOUS at 08:27

## 2024-08-09 RX ADMIN — PROPOFOL 200 MG: 10 INJECTION, EMULSION INTRAVENOUS at 11:47

## 2024-08-09 RX ADMIN — Medication 50 MCG: at 12:05

## 2024-08-09 RX ADMIN — ONDANSETRON 4 MG: 2 INJECTION INTRAMUSCULAR; INTRAVENOUS at 07:20

## 2024-08-09 RX ADMIN — ACETAMINOPHEN 1000 MG: 10 INJECTION INTRAVENOUS at 06:43

## 2024-08-09 RX ADMIN — ROCURONIUM BROMIDE 10 MG: 10 INJECTION, SOLUTION INTRAVENOUS at 12:18

## 2024-08-09 RX ADMIN — SODIUM CHLORIDE 1000 ML: 0.9 INJECTION, SOLUTION INTRAVENOUS at 06:44

## 2024-08-09 RX ADMIN — ROCURONIUM BROMIDE 35 MG: 10 INJECTION, SOLUTION INTRAVENOUS at 11:51

## 2024-08-09 RX ADMIN — IOHEXOL 100 ML: 350 INJECTION, SOLUTION INTRAVENOUS at 07:35

## 2024-08-09 RX ADMIN — SUGAMMADEX 200 MG: 100 INJECTION, SOLUTION INTRAVENOUS at 12:33

## 2024-08-09 RX ADMIN — KETOROLAC TROMETHAMINE 15 MG: 30 INJECTION, SOLUTION INTRAMUSCULAR; INTRAVENOUS at 07:26

## 2024-08-09 RX ADMIN — SODIUM CHLORIDE 1000 ML: 0.9 INJECTION, SOLUTION INTRAVENOUS at 06:35

## 2024-08-09 RX ADMIN — OXYCODONE HYDROCHLORIDE AND ACETAMINOPHEN 2 TABLET: 5; 325 TABLET ORAL at 18:03

## 2024-08-09 RX ADMIN — MIDAZOLAM 2 MG: 1 INJECTION INTRAMUSCULAR; INTRAVENOUS at 11:42

## 2024-08-09 RX ADMIN — ONDANSETRON 4 MG: 2 INJECTION INTRAMUSCULAR; INTRAVENOUS at 12:28

## 2024-08-09 RX ADMIN — METRONIDAZOLE: 500 INJECTION, SOLUTION INTRAVENOUS at 11:52

## 2024-08-09 RX ADMIN — PROPOFOL 120 MCG/KG/MIN: 10 INJECTION, EMULSION INTRAVENOUS at 11:48

## 2024-08-09 NOTE — H&P
"History and Physical - General Surgery   Aurelia Mccall 23 y.o. female MRN: [id number]  Unit/Bed#: ED-14 Encounter: [id number]    Assessment & Plan   23-year-old female with no medical comorbidities presents with sepsis secondary to acute appendicitis with possible gangrene and/or perforation    Tachycardic upon presentation,   WBC 18.7 from 17.8 two days ago  Hemoglobin 12 from 11.5    Sepsis workup pending    CT abdomen pelvis significant for distended fluid-filled appendix with wall thickening measuring up to 10 mm in diameter with possible gangrene and/or perforation.  No abscess or pneumoperitoneum noted.  Small amount of new pelvic free fluid.  These findings are new since the study on 8/8/2024    On exam, abdomen is nondistended, soft and nontender except the right lower quadrant at McBurney's point has focal tenderness and mild guarding.  No rigidity or other signs of generalized peritonitis.    Admit to surgery service under Dr. Lagunas for laparoscopic appendectomy  N.p.o., IV fluids  IV Zosyn  Analgesics and antiemetics as needed  Follow-up sepsis workup  Informed consent obtained, case booked, all patient and her mother's questions and concerns addressed to her satisfaction at this time      HPI:  Aurelia Mccall is a 23 y.o. female with no other medical comorbidities or surgical history who presents with with abdominal pain associated nausea.  Patient states pain began on Wednesday evening, for which she presented for evaluation and treatment on the evening of 8/7/2024.  She was found to have abnormal appendix on CT with radiologist stating \"...upper limits of normal measuring 6 to 7 cm in diameter with no significant periappendiceal inflammatory changes to suggest appendicitis\", and patient was sent home with Zofran and Bentyl with return to ED precautions.  Yesterday she states she began to feel a little bit better, but the pain returned last night which prompted her to present again for " evaluation and treatment today.  Upon examination, patient states her pain began 2 days ago after eating [employer] in the epigastric periappendiceal region with associated nausea no vomiting, but today it has localized to the right lower quadrant still with associated nausea but no vomiting.  Patient's last bowel movement was on Wednesday which she described as diarrhea, no hematochezia or melena.  She has never felt any similar symptomology than this previously.  She denies any fevers, chills, chest pain, shortness of breath.  CT abdomen pelvis significant for findings of acute possibly gangrenous and/or perforated appendicitis and patient will be admitted to the surgery service for surgical versus medical management.      Review of Systems  Review of Systems - History obtained from chart review and the patient  General ROS: negative for - chills or fever  ENT ROS: negative for - hearing change or visual changes  Hematological and Lymphatic ROS: negative for - bleeding problems or bruising  Respiratory ROS: negative for - cough, shortness of breath, or wheezing  Cardiovascular ROS: negative for - chest pain or palpitations  Gastrointestinal ROS: As above  Genito-Urinary ROS: negative for - dysuria or hematuria  Musculoskeletal ROS: negative for - muscle pain or muscular weakness  Neurological ROS: no TIA or stroke symptoms  Dermatological ROS: negative for rash and skin lesion changes   All other systems reviewed and negative    Historical Information   Past Medical History:   Diagnosis Date    Asthma     Goiter     r/s 8/2016     History reviewed. No pertinent surgical history.  Social History   Social History     Substance and Sexual Activity   Alcohol Use No     Social History     Substance and Sexual Activity   Drug Use No     Social History     Tobacco Use   Smoking Status Never   Smokeless Tobacco Never     Family History:   Family History   Problem Relation Age of Onset    Arthritis Father     Hypertension Father      Diabetes Father     Benign prostatic hyperplasia Father     Arthritis Sister        Meds/Allergies   PTA meds:   Prior to Admission Medications   Prescriptions Last Dose Informant Patient Reported? Taking?   dicyclomine (BENTYL) 20 mg tablet   No No   Sig: Take 1 tablet (20 mg total) by mouth 2 (two) times a day   famotidine (PEPCID) 20 mg tablet   No No   Sig: Take 1 tablet (20 mg total) by mouth 2 (two) times a day   ondansetron (ZOFRAN) 4 mg tablet   No No   Sig: Take 1 tablet (4 mg total) by mouth every 12 (twelve) hours as needed for nausea or vomiting   ondansetron (ZOFRAN-ODT) 4 mg disintegrating tablet   No No   Sig: Take 1 tablet (4 mg total) by mouth every 6 (six) hours as needed for nausea or vomiting      Facility-Administered Medications: None     Allergies   Allergen Reactions    Dust Mite Extract Wheezing       Objective   First Vitals:   Blood Pressure: 111/54 (08/09/24 0553)  Pulse: (!) 129 (08/09/24 0553)  Temperature: 100.2 °F (37.9 °C) (08/09/24 0553)  Temp Source: Oral (08/09/24 0553)  Respirations: 16 (08/09/24 0553)  Weight - Scale: 83.4 kg (183 lb 13.8 oz) (08/09/24 0553)  SpO2: 96 % (08/09/24 0553)    Current Vitals:   Blood Pressure: 117/71 (08/09/24 0829)  Pulse: 93 (08/09/24 0829)  Temperature: 98.3 °F (36.8 °C) (08/09/24 0829)  Temp Source: Oral (08/09/24 0829)  Respirations: 18 (08/09/24 0829)  Weight - Scale: 83.4 kg (183 lb 13.8 oz) (08/09/24 0553)  SpO2: 97 % (08/09/24 0829)      Intake/Output Summary (Last 24 hours) at 8/9/2024 0914  Last data filed at 8/9/2024 0827  Gross per 24 hour   Intake 2100 ml   Output --   Net 2100 ml       Invasive Devices       Peripheral Intravenous Line  Duration             Peripheral IV 08/09/24 Right Antecubital <1 day                    Physical Exam  General appearance: alert and oriented, in no acute distress  Head: Normocephalic, without obvious abnormality, atraumatic  Eyes: Sclerae anicteric  Neck: supple, symmetrical, trachea  "midline  Lungs: clear to auscultation bilaterally  Heart: regular rate and rhythm, S1, S2 normal, no murmur, click, rub or gallop  Abdomen: As above  Extremities: extremities normal, warm and well-perfused; no cyanosis, clubbing, or edema  Skin: Skin color, texture, turgor normal. No rashes or lesions  Neurologic: Grossly normal    Lab Results: I have personally reviewed pertinent lab results.  , CBC:   Lab Results   Component Value Date    WBC 18.73 (H) 08/09/2024    HGB 12.0 08/09/2024    HCT 38.4 08/09/2024    MCV 81 (L) 08/09/2024     (H) 08/09/2024    RBC 4.72 08/09/2024    MCH 25.4 (L) 08/09/2024    MCHC 31.3 (L) 08/09/2024    RDW 14.2 08/09/2024    MPV 9.3 08/09/2024    NRBC 0 08/09/2024   , CMP:   Lab Results   Component Value Date    SODIUM 136 08/09/2024    K 4.2 08/09/2024     08/09/2024    CO2 23 08/09/2024    BUN 7 08/09/2024    CREATININE 0.73 08/09/2024    CALCIUM 9.3 08/09/2024    AST 14 08/09/2024    ALT 13 08/09/2024    ALKPHOS 64 08/09/2024    EGFR 118 08/09/2024   , Urinalysis: No results found for: \"COLORU\", \"CLARITYU\", \"SPECGRAV\", \"PHUR\", \"LEUKOCYTESUR\", \"NITRITE\", \"PROTEINUA\", \"GLUCOSEU\", \"KETONESU\", \"BILIRUBINUR\", \"BLOODU\", Lipase:   Lab Results   Component Value Date    LIPASE 21 08/09/2024     Imaging: I have personally reviewed pertinent films in PACS  Procedure: CT abdomen pelvis with contrast    Result Date: 8/9/2024  Narrative: CT ABDOMEN AND PELVIS WITH IV CONTRAST INDICATION: Initially epigastric pain 2 days prior, now reports severe RLQ pain that woke her up from sleep.  Had slightly enlarged appendix 2 days prior.. COMPARISON: CT abdomen pelvis 8/8/2024 TECHNIQUE: CT examination of the abdomen and pelvis was performed. Multiplanar 2D reformatted images were created from the source data. This examination, like all CT scans performed in the [facility], was performed utilizing techniques to minimize radiation dose exposure, including the use of iterative " reconstruction and automated exposure control. Radiation dose length product (DLP) for this visit: 496 mGy-cm IV Contrast: 100 mL of iohexol (OMNIPAQUE) Enteric Contrast: Not administered. FINDINGS: ABDOMEN LOWER CHEST: No clinically significant abnormality in the visualized lower chest. LIVER/BILIARY TREE: Unremarkable. GALLBLADDER: No calcified gallstones. No pericholecystic inflammatory change. SPLEEN: Unremarkable. PANCREAS: Unremarkable. ADRENAL GLANDS: Unremarkable. KIDNEYS/URETERS: Unremarkable. No hydronephrosis. STOMACH AND BOWEL: No intestinal obstruction. Mild wall thickening of the terminal ileum in the vicinity of the patient's periappendiceal inflammation, likely reactive. APPENDIX: Slightly distended, thick walled enhancing fluid-filled appendix measuring up to 10 mm in diameter with possible focal discontinuity in the appendiceal wall, along with new small amount of periappendiceal fluid and moderate inflammatory stranding tracking to the broad ligament, with likely reactive change of the terminal ileum as described above, consistent with acute appendicitis, possibly gangrenous with focal perforation. Changes are new since the study from yesterday. No pneumoperitoneum. No abscess. ABDOMINOPELVIC CAVITY: Small amount of new pelvic free fluid. No pneumoperitoneum or adenopathy. Some likely reactive right lower quadrant mesenteric lymph nodes are present. VESSELS: Unremarkable for patient's age. PELVIS REPRODUCTIVE ORGANS: Unremarkable for patient's age. URINARY BLADDER: Unremarkable. ABDOMINAL WALL/INGUINAL REGIONS: Unremarkable. BONES: No acute fracture or suspicious osseous lesion.     Impression: Interim development of acute appendicitis, possibly gangrenous with perforation and localized inflammatory change as described above, new since the study of 8/8/2024. Small amount of free fluid. No abscess or pneumoperitoneum noted. I personally discussed this study with JACK ADAME on 8/9/2024 8:15 AM.  Workstation performed: [id number]     Procedure: CT abdomen pelvis with contrast    Result Date: 8/8/2024  Narrative: CT ABDOMEN AND PELVIS WITH IV CONTRAST INDICATION: Periumbilical/epigastric pain. WBC 18. COMPARISON: CT of the abdomen pelvis November 1, 2020. TECHNIQUE: CT examination of the abdomen and pelvis was performed. Multiplanar 2D reformatted images were created from the source data. This examination, like all CT scans performed in the [facility], was performed utilizing techniques to minimize radiation dose exposure, including the use of iterative reconstruction and automated exposure control. Radiation dose length product (DLP) for this visit: 537.67 mGy-cm IV Contrast: 100 mL of iohexol (OMNIPAQUE) Enteric Contrast: Not administered. FINDINGS: ABDOMEN LOWER CHEST: No clinically significant abnormality in the visualized lower chest. LIVER/BILIARY TREE: Unremarkable. GALLBLADDER: No calcified gallstones. No pericholecystic inflammatory change. SPLEEN: Unremarkable. PANCREAS: Unremarkable. ADRENAL GLANDS: Unremarkable. KIDNEYS/URETERS: Unremarkable. No hydronephrosis. STOMACH AND BOWEL: No bowel obstruction. APPENDIX: The appendix is at the upper limits of normal measuring 6 to 7 cm in diameter (series 601, image 79) however there are no significant periappendiceal inflammatory changes to suggest appendicitis. ABDOMINOPELVIC CAVITY: No ascites. No pneumoperitoneum. No lymphadenopathy. VESSELS: Unremarkable for patient's age. PELVIS REPRODUCTIVE ORGANS: Unremarkable for patient's age. URINARY BLADDER: Unremarkable. ABDOMINAL WALL/INGUINAL REGIONS: Unremarkable. BONES: No acute fracture or suspicious osseous lesion.     Impression: No acute inflammatory process identified within the abdomen or pelvis. Workstation performed: [id number]         Trinidad Estes PA-C   8/9/2024

## 2024-08-09 NOTE — ED PROVIDER NOTES
"History  Chief Complaint   Patient presents with    Abdominal Pain     Pt reports severe RLQ abd pain that awoke her today.  Pt seen earlier this week and says she was told \"appendix is larger than usual.\"  No vomiting or diarrhea      This is a 23-year-old female who presents to the ED for evaluation of right lower quadrant abdominal pain.  Patient reports initially having approximately 2 days of epigastric abdominal pain reports that she was \"woken up from sleep\" with right lower quadrant abdominal pain.  Reports the pain in her right lower quadrant as a stabbing sensation, constant, worse with positional changes.  Does report epigastric pain continues though has not significantly worsened.  Does report 1 episode of vomiting a day ago, denies any vomiting last night or this morning.  Does report continued nausea.  Reports she last ate yesterday approximately 6 PM.  She denies any recent fevers, chills, headaches, lightheadedness, chest pain, SOB, flank pain, dysuria.  Reports last BM was a few days prior, was loose nonbloody stool.  Denies any previous surgeries.        Prior to Admission Medications   Prescriptions Last Dose Informant Patient Reported? Taking?   ondansetron (ZOFRAN) 4 mg tablet   No No   Sig: Take 1 tablet (4 mg total) by mouth every 12 (twelve) hours as needed for nausea or vomiting   ondansetron (ZOFRAN-ODT) 4 mg disintegrating tablet   No No   Sig: Take 1 tablet (4 mg total) by mouth every 6 (six) hours as needed for nausea or vomiting      Facility-Administered Medications: None       Past Medical History:   Diagnosis Date    Asthma     Goiter     r/s 8/2016       History reviewed. No pertinent surgical history.    Family History   Problem Relation Age of Onset    Arthritis Father     Hypertension Father     Diabetes Father     Benign prostatic hyperplasia Father     Arthritis Sister      I have reviewed and agree with the history as documented.    E-Cigarette/Vaping    E-Cigarette Use Never " User      E-Cigarette/Vaping Substances    Nicotine No     THC No     CBD No     Flavoring No     Other No     Unknown No      Social History     Tobacco Use    Smoking status: Never    Smokeless tobacco: Never   Vaping Use    Vaping status: Never Used   Substance Use Topics    Alcohol use: Not Currently    Drug use: No       Review of Systems   Constitutional:  Negative for chills and fever.   Eyes:  Negative for photophobia and visual disturbance.   Respiratory:  Negative for cough and shortness of breath.    Cardiovascular:  Negative for chest pain and palpitations.   Gastrointestinal:  Positive for abdominal pain and nausea. Negative for blood in stool, diarrhea and vomiting.   Genitourinary:  Negative for difficulty urinating, dysuria, flank pain and hematuria.   Musculoskeletal:  Negative for neck pain and neck stiffness.   Neurological:  Negative for dizziness, syncope, light-headedness and headaches.       Physical Exam  Physical Exam  Vitals and nursing note reviewed.   Constitutional:       General: She is not in acute distress.     Appearance: She is well-developed. She is not diaphoretic.   HENT:      Head: Normocephalic and atraumatic.   Eyes:      Conjunctiva/sclera: Conjunctivae normal.   Cardiovascular:      Rate and Rhythm: Normal rate and regular rhythm.      Heart sounds: No murmur heard.  Pulmonary:      Effort: Pulmonary effort is normal. No respiratory distress.      Breath sounds: Normal breath sounds. No wheezing, rhonchi or rales.   Abdominal:      General: Abdomen is flat.      Palpations: Abdomen is soft.      Tenderness: There is abdominal tenderness in the right lower quadrant and epigastric area. There is no right CVA tenderness or left CVA tenderness. Negative signs include Burns's sign.   Musculoskeletal:         General: No swelling.      Cervical back: Normal range of motion and neck supple.   Skin:     General: Skin is warm and dry.      Capillary Refill: Capillary refill takes  less than 2 seconds.   Neurological:      General: No focal deficit present.      Mental Status: She is alert and oriented to person, place, and time.   Psychiatric:         Mood and Affect: Mood normal.         Vital Signs  ED Triage Vitals [08/09/24 0553]   Temperature Pulse Respirations Blood Pressure SpO2   100.2 °F (37.9 °C) (!) 129 16 111/54 96 %      Temp Source Heart Rate Source Patient Position - Orthostatic VS BP Location FiO2 (%)   Oral Monitor Sitting Right arm --      Pain Score       8           Vitals:    08/09/24 1308 08/09/24 1324 08/09/24 1356 08/09/24 1454   BP: 105/57 108/59 115/68 113/68   Pulse: 98 96 100    Patient Position - Orthostatic VS:    Lying         Visual Acuity      ED Medications  Medications   lactated ringers infusion (0 mL/hr Intravenous Stopped 8/9/24 1451)   ondansetron (ZOFRAN) injection 4 mg ( Intravenous MAR Hold 8/9/24 1133)   HYDROmorphone (DILAUDID) injection 1 mg ( Intravenous MAR Hold 8/9/24 1133)   HYDROmorphone HCl (DILAUDID) injection 0.2 mg ( Intravenous MAR Hold 8/9/24 1133)   HYDROmorphone (DILAUDID) injection 0.5 mg ( Intravenous MAR Hold 8/9/24 1133)   piperacillin-tazobactam (ZOSYN) IVPB (EXTENDED INFUSION) 4.5 g ( Intravenous Dose Auto Held 8/12/24 2030)   lactated ringers bolus 1,000 mL (has no administration in time range)     And   lactated ringers bolus 1,000 mL (has no administration in time range)   sodium chloride 0.9 % bolus 1,000 mL (has no administration in time range)     And   sodium chloride 0.9 % bolus 1,000 mL (has no administration in time range)   morphine injection 2 mg (has no administration in time range)   oxyCODONE-acetaminophen (PERCOCET) 5-325 mg per tablet 1 tablet (has no administration in time range)   oxyCODONE-acetaminophen (PERCOCET) 5-325 mg per tablet 2 tablet (has no administration in time range)   sodium chloride 0.9 % bolus 1,000 mL (0 mL Intravenous Stopped 8/9/24 0827)   acetaminophen (Ofirmev) injection 1,000 mg (0 mg  Intravenous Stopped 8/9/24 0658)   sodium chloride 0.9 % bolus 1,000 mL (0 mL Intravenous Stopped 8/9/24 0729)   ondansetron (ZOFRAN) injection 4 mg (4 mg Intravenous Given 8/9/24 0720)   ketorolac (TORADOL) injection 15 mg (15 mg Intravenous Given 8/9/24 0726)   iohexol (OMNIPAQUE) 350 MG/ML injection (MULTI-DOSE) 100 mL (100 mL Intravenous Given 8/9/24 0735)   piperacillin-tazobactam (ZOSYN) IVPB 4.5 g (0 g Intravenous Stopped 8/9/24 0857)   ceFAZolin (ANCEF) IVPB (premix in dextrose) 2,000 mg 50 mL (0 mg Intravenous Stopped 8/9/24 1137)   metroNIDAZOLE (FLAGYL) IVPB (premix) 500 mg 100 mL ( Intravenous Stopped 8/9/24 1221)       Diagnostic Studies  Results Reviewed       Procedure Component Value Units Date/Time    Blood culture #1 [[id number]] Collected: 08/09/24 0940    Lab Status: Preliminary result Specimen: Blood from Hand, Right Updated: 08/09/24 1501     Blood Culture Received in Microbiology Lab. Culture in Progress.    Blood culture #2 [[id number]] Collected: 08/09/24 0939    Lab Status: Preliminary result Specimen: Blood from Arm, Left Updated: 08/09/24 1501     Blood Culture Received in Microbiology Lab. Culture in Progress.    UA w Reflex to Microscopic w Reflex to Culture [[id number]]  (Abnormal) Collected: 08/09/24 1002    Lab Status: Final result Specimen: Urine, Clean Catch Updated: 08/09/24 1103     Color, UA Colorless     Clarity, UA Clear     Specific Gravity, UA >=1.050     pH, UA 5.5     Leukocytes, UA Negative     Nitrite, UA Negative     Protein, UA Negative mg/dl      Glucose, UA Negative mg/dl      Ketones, UA 40 (2+) mg/dl      Urobilinogen, UA <2.0 mg/dl      Bilirubin, UA Negative     Occult Blood, UA Negative    Procalcitonin [[id number]]  (Normal) Collected: 08/09/24 0642    Lab Status: Final result Specimen: Blood from Arm, Right Updated: 08/09/24 1038     Procalcitonin <0.05 ng/ml     Protime-INR [[id number]]  (Normal) Collected: 08/09/24 0939    Lab Status: Final result Specimen: Blood  from Arm, Left Updated: 08/09/24 1029     Protime 14.7 seconds      INR 1.13    Narrative:      INR Therapeutic Range    Indication                                             INR Range      Atrial Fibrillation                                               2.0-3.0  Hypercoagulable State                                    2.0.2.3  Left Ventricular Asist Device                            2.0-3.0  Mechanical Heart Valve                                  -    Aortic(with afib, MI, embolism, HF, LA enlargement,    and/or coagulopathy)                                     2.0-3.0 (2.5-3.5)     Mitral                                                             2.5-3.5  Prosthetic/Bioprosthetic Heart Valve               2.0-3.0  Venous thromboembolism (VTE: VT, PE        2.0-3.0    APTT [[id number]]  (Normal) Collected: 08/09/24 0939    Lab Status: Final result Specimen: Blood from Arm, Left Updated: 08/09/24 1029     PTT 32 seconds     Lactic acid [[id number]]  (Normal) Collected: 08/09/24 0939    Lab Status: Final result Specimen: Blood from Arm, Left Updated: 08/09/24 1019     LACTIC ACID 0.6 mmol/L     Narrative:      Result may be elevated if tourniquet was used during collection.    Basic metabolic panel [[id number]] Collected: 08/09/24 0642    Lab Status: Final result Specimen: Blood from Arm, Right Updated: 08/09/24 0713     Sodium 136 mmol/L      Potassium 4.2 mmol/L      Chloride 105 mmol/L      CO2 23 mmol/L      ANION GAP 8 mmol/L      BUN 7 mg/dL      Creatinine 0.73 mg/dL      Glucose 97 mg/dL      Calcium 9.3 mg/dL      eGFR 118 ml/min/1.73sq m     Narrative:      [employer] guidelines for Chronic Kidney Disease (CKD):     Stage 1 with normal or high GFR (GFR > 90 mL/min/1.73 square meters)    Stage 2 Mild CKD (GFR = 60-89 mL/min/1.73 square meters)    Stage 3A Moderate CKD (GFR = 45-59 mL/min/1.73 square meters)    Stage 3B Moderate CKD (GFR = 30-44 mL/min/1.73 square meters)    Stage 4  Severe CKD (GFR = 15-29 mL/min/1.73 square meters)    Stage 5 End Stage CKD (GFR <15 mL/min/1.73 square meters)  Note: GFR calculation is accurate only with a steady state creatinine    Hepatic function panel [[id number]]  (Normal) Collected: 08/09/24 0642    Lab Status: Final result Specimen: Blood from Arm, Right Updated: 08/09/24 0713     Total Bilirubin 0.62 mg/dL      Bilirubin, Direct 0.15 mg/dL      Alkaline Phosphatase 64 U/L      AST 14 U/L      ALT 13 U/L      Total Protein 7.5 g/dL      Albumin 4.2 g/dL     Lipase [[id number]]  (Normal) Collected: 08/09/24 0642    Lab Status: Final result Specimen: Blood from Arm, Right Updated: 08/09/24 0713     Lipase 21 u/L     CBC and differential [[id number]]  (Abnormal) Collected: 08/09/24 0642    Lab Status: Final result Specimen: Blood from Arm, Right Updated: 08/09/24 0655     WBC 18.73 Thousand/uL      RBC 4.72 Million/uL      Hemoglobin 12.0 g/dL      Hematocrit 38.4 %      MCV 81 fL      MCH 25.4 pg      MCHC 31.3 g/dL      RDW 14.2 %      MPV 9.3 fL      Platelets 436 Thousands/uL      nRBC 0 /100 WBCs      Segmented % 84 %      Immature Grans % 1 %      Lymphocytes % 9 %      Monocytes % 6 %      Eosinophils Relative 0 %      Basophils Relative 0 %      Absolute Neutrophils 15.66 Thousands/µL      Absolute Immature Grans 0.10 Thousand/uL      Absolute Lymphocytes 1.76 Thousands/µL      Absolute Monocytes 1.16 Thousand/µL      Eosinophils Absolute 0.02 Thousand/µL      Basophils Absolute 0.03 Thousands/µL     POCT pregnancy, urine [[id number]]  (Normal) Resulted: 08/09/24 0635    Lab Status: Final result Updated: 08/09/24 0650     EXT Preg Test, Ur Negative     Control Valid                   CT abdomen pelvis with contrast   Final Result by Fly Matthew MD (08/09 0815)         Interim development of acute appendicitis, possibly gangrenous with perforation and localized inflammatory change as described above, new since the study of 8/8/2024. Small amount  of free fluid. No abscess or pneumoperitoneum noted.         I personally discussed this study with JAIRO QUIROZ on 8/9/2024 8:15 AM.            Workstation performed: [id number]                    Procedures  CriticalCare Time    Date/Time: 8/9/2024 8:30 AM    Performed by: Jairo Quiroz PA-C  Authorized by: Jairo Quiroz PA-C    Critical care provider statement:     Critical care time (minutes):  35    Critical care time was exclusive of:  Separately billable procedures and treating other patients and teaching time    Critical care was necessary to treat or prevent imminent or life-threatening deterioration of the following conditions:  Sepsis    Critical care was time spent personally by me on the following activities:  Blood draw for specimens, obtaining history from patient or surrogate, development of treatment plan with patient or surrogate, discussions with consultants, evaluation of patient's response to treatment, examination of patient, ordering and performing treatments and interventions, ordering and review of laboratory studies, re-evaluation of patient's condition and review of old charts    I assumed direction of critical care for this patient from another provider in my specialty: no             ED Course  ED Course as of 08/09/24 1526   Fri Aug 09, 2024   0821 CT abdomen pelvis with contrast  IMPRESSION:        Interim development of acute appendicitis, possibly gangrenous with perforation and localized inflammatory change as described above, new since the study of 8/8/2024. Small amount of free fluid. No abscess or pneumoperitoneum noted.        0821 General Surgery paged.  Zosyn ordered.                                 SBIRT 22yo+      Flowsheet Row Most Recent Value   Initial Alcohol Screen: US AUDIT-C     1. How often do you have a drink containing alcohol? 0 Filed at: 08/09/2024 0557   2. How many drinks containing alcohol do you have on a typical day you are drinking?  0 Filed at: 08/09/2024  0557   3a. Male UNDER 65: How often do you have five or more drinks on one occasion? 0 Filed at: 08/09/2024 0557   3b. FEMALE Any Age, or MALE 65+: How often do you have 4 or more drinks on one occassion? 0 Filed at: 08/09/2024 0557   Audit-C Score 0 Filed at: 08/09/2024 0557   EITAN: How many times in the past year have you...    Used an illegal drug or used a prescription medication for non-medical reasons? Never Filed at: 08/09/2024 0557                      Medical Decision Making  23-year-old female presents to the ED for evaluation of abdominal pain.  Initially epigastric when seen in the ED 2 days prior, now complains of sharp right lower quadrant abdominal pain.  CT abdomen pelvis reviewed from 2 days prior which showed appendix was upper size of normal.  Patient with leukocytosis at 18.  Due to concern for appendicitis repeat CT abdomen pelvis was ordered; results showed acute appendicitis with possible perforation.  Discussed case with general surgery who admitted patient to their service, patient taken the OR.  See ED course for additional details.    Amount and/or Complexity of Data Reviewed  Labs: ordered.  Radiology: ordered. Decision-making details documented in ED Course.    Risk  Prescription drug management.  Decision regarding hospitalization.                 Disposition  Final diagnoses:   Acute appendicitis     Time reflects when diagnosis was documented in both MDM as applicable and the Disposition within this note       Time User Action Codes Description Comment    8/9/2024  9:21 AM Jessie Cuenca Add [K35.30] Acute appendicitis with localized peritonitis, unspecified whether abscess present, unspecified whether gangrene present, unspecified whether perforation present     8/9/2024  9:25 AM Jairo Quiroz Add [K35.80] Acute appendicitis     8/9/2024  9:25 AM Jairo Quiroz Remove [K35.80] Acute appendicitis     8/9/2024  9:26 AM Jairo Quiroz Add [K35.80] Acute appendicitis     8/9/2024 12:22 PM  Alex Honeycutt Modify [K35.30] Acute appendicitis with localized peritonitis, unspecified whether abscess present, unspecified whether gangrene present, unspecified whether perforation present           ED Disposition       ED Disposition   Admit    Condition   Stable    Date/Time   Fri Aug 9, 2024 0926    Comment   Case was discussed with Trinidad Estes PA-C and the patient's admission status was agreed to be Admission Status: inpatient status to the service of Dr. Lagunas.               Follow-up Information       Follow up With Specialties Details Why Contact Info    Vinh Lagunas MD General Surgery, Wound Care Schedule an appointment as soon as possible for a visit in 2 week(s)  [address] [place] [id number]  [phone]              Current Discharge Medication List        START taking these medications    Details   amoxicillin-clavulanate (AUGMENTIN) 875-125 mg per tablet Take 1 tablet by mouth every 12 (twelve) hours for 5 days  Qty: 10 tablet, Refills: 0    Associated Diagnoses: Acute appendicitis with localized peritonitis, unspecified whether abscess present, unspecified whether gangrene present, unspecified whether perforation present; Acute appendicitis      oxyCODONE (Roxicodone) 5 immediate release tablet Take 1 tablet (5 mg total) by mouth every 4 (four) hours as needed for moderate pain for up to 10 days Max Daily Amount: 30 mg  Qty: 12 tablet, Refills: 0    Associated Diagnoses: Acute appendicitis with localized peritonitis, unspecified whether abscess present, unspecified whether gangrene present, unspecified whether perforation present; Acute appendicitis           STOP taking these medications       ondansetron (ZOFRAN) 4 mg tablet Comments:   Reason for Stopping:         ondansetron (ZOFRAN-ODT) 4 mg disintegrating tablet Comments:   Reason for Stopping:               No discharge procedures on file.    PDMP Review       None            ED Provider  Electronically Signed  by             Jairo Quiroz PA-C  08/09/24 1526

## 2024-08-09 NOTE — QUICK NOTE
The IP order entered today was entered in error. The correct patient class is OPNC from the start of the admission through time of discharge.

## 2024-08-09 NOTE — PLAN OF CARE
Problem: Potential for Falls  Goal: Patient will remain free of falls  Description: INTERVENTIONS:  - Educate patient/family on patient safety including physical limitations  - Instruct patient to call for assistance with activity   - Consult OT/PT to assist with strengthening/mobility   - Keep Call bell within reach  - Keep bed low and locked with side rails adjusted as appropriate  - Keep care items and personal belongings within reach  - Initiate and maintain comfort rounds  - Make Fall Risk Sign visible to staff  - Offer Toileting   - Initiate/Maintain alarm  - Obtain necessary fall risk management equipment  - Apply yellow socks and bracelet for high fall risk patients  - Consider moving patient to room near nurses station  Outcome: Progressing     Problem: PAIN - ADULT  Goal: Verbalizes/displays adequate comfort level or baseline comfort level  Description: Interventions:  - Encourage patient to monitor pain and request assistance  - Assess pain using appropriate pain scale  - Administer analgesics based on type and severity of pain and evaluate response  - Implement non-pharmacological measures as appropriate and evaluate response  - Consider cultural and social influences on pain and pain management  - Notify physician/advanced practitioner if interventions unsuccessful or patient reports new pain  Outcome: Progressing     Problem: INFECTION - ADULT  Goal: Absence or prevention of progression during hospitalization  Description: INTERVENTIONS:  - Assess and monitor for signs and symptoms of infection  - Monitor lab/diagnostic results  - Monitor all insertion sites, i.e. indwelling lines, tubes, and drains  - Monitor endotracheal if appropriate and nasal secretions for changes in amount and color  - [place] appropriate cooling/warming therapies per order  - Administer medications as ordered  - Instruct and encourage patient and family to use good hand hygiene technique  - Identify and instruct in  appropriate isolation precautions for identified infection/condition  Outcome: Progressing  Goal: Absence of fever/infection during neutropenic period  Description: INTERVENTIONS:  - Monitor WBC    Outcome: Progressing     Problem: SAFETY ADULT  Goal: Patient will remain free of falls  Description: INTERVENTIONS:  - Educate patient/family on patient safety including physical limitations  - Instruct patient to call for assistance with activity   - Consult OT/PT to assist with strengthening/mobility   - Keep Call bell within reach  - Keep bed low and locked with side rails adjusted as appropriate  - Keep care items and personal belongings within reach  - Initiate and maintain comfort rounds  - Make Fall Risk Sign visible to staff  - Offer Toileting   - Initiate/Maintain alarm  - Obtain necessary fall risk management equipment  - Apply yellow socks and bracelet for high fall risk patients  - Consider moving patient to room near nurses station  Outcome: Progressing  Goal: Maintain or return to baseline ADL function  Description: INTERVENTIONS:  -  Assess patient's ability to carry out ADLs; assess patient's baseline for ADL function and identify physical deficits which impact ability to perform ADLs (bathing, care of mouth/teeth, toileting, grooming, dressing, etc.)  - Assess/evaluate cause of self-care deficits   - Assess range of motion  - Assess patient's mobility; develop plan if impaired  - Assess patient's need for assistive devices and provide as appropriate  - Encourage maximum independence but intervene and supervise when necessary  - Involve family in performance of ADLs  - Assess for home care needs following discharge   - Consider OT consult to assist with ADL evaluation and planning for discharge  - Provide patient education as appropriate  Outcome: Progressing  Goal: Maintains/Returns to pre admission functional level  Description: INTERVENTIONS:  - Perform AM-PAC 6 Click Basic Mobility/ Daily Activity  assessment daily.  - Set and communicate daily mobility goal to care team and patient/family/caregiver.   - Collaborate with rehabilitation services on mobility goals if consulted  - Perform Range of Motion   - Reposition patient   - Dangle patient   - Stand patient  - Ambulate patient  - Out of bed to chair   - Out of bed for meals  - Out of bed for toileting  - Record patient progress and toleration of activity level   Outcome: Progressing     Problem: DISCHARGE PLANNING  Goal: Discharge to home or other facility with appropriate resources  Description: INTERVENTIONS:  - Identify barriers to discharge w/patient and caregiver  - Arrange for needed discharge resources and transportation as appropriate  - Identify discharge learning needs (meds, wound care, etc.)  - Arrange for interpretive services to assist at discharge as needed  - Refer to Case Management Department for coordinating discharge planning if the patient needs post-hospital services based on physician/advanced practitioner order or complex needs related to functional status, cognitive ability, or social support system  Outcome: Progressing     Problem: Knowledge Deficit  Goal: Patient/family/caregiver demonstrates understanding of disease process, treatment plan, medications, and discharge instructions  Description: Complete learning assessment and assess knowledge base.  Interventions:  - Provide teaching at level of understanding  - Provide teaching via preferred learning methods  Outcome: Progressing     Problem: GASTROINTESTINAL - ADULT  Goal: Minimal or absence of nausea and/or vomiting  Description: INTERVENTIONS:  - Administer IV fluids if ordered to ensure adequate hydration  - Maintain NPO status until nausea and vomiting are resolved  - Nasogastric tube if ordered  - Administer ordered antiemetic medications as needed  - Provide nonpharmacologic comfort measures as appropriate  - Advance diet as tolerated, if ordered  - Consider nutrition  services referral to assist patient with adequate nutrition and appropriate food choices  Outcome: Progressing  Goal: Maintains or returns to baseline bowel function  Description: INTERVENTIONS:  - Assess bowel function  - Encourage oral fluids to ensure adequate hydration  - Administer IV fluids if ordered to ensure adequate hydration  - Administer ordered medications as needed  - Encourage mobilization and activity  - Consider nutritional services referral to assist patient with adequate nutrition and appropriate food choices  Outcome: Progressing  Goal: Maintains adequate nutritional intake  Description: INTERVENTIONS:  - Monitor percentage of each meal consumed  - Identify factors contributing to decreased intake, treat as appropriate  - Assist with meals as needed  - Monitor I&O, weight, and lab values if indicated  - Obtain nutrition services referral as needed  Outcome: Progressing     Problem: METABOLIC, FLUID AND ELECTROLYTES - ADULT  Goal: Electrolytes maintained within normal limits  Description: INTERVENTIONS:  - Monitor labs and assess patient for signs and symptoms of electrolyte imbalances  - Administer electrolyte replacement as ordered  - Monitor response to electrolyte replacements, including repeat lab results as appropriate  - Instruct patient on fluid and nutrition as appropriate  Outcome: Progressing  Goal: Fluid balance maintained  Description: INTERVENTIONS:  - Monitor labs   - Monitor I/O and WT  - Instruct patient on fluid and nutrition as appropriate  - Assess for signs & symptoms of volume excess or deficit  Outcome: Progressing

## 2024-08-09 NOTE — ANESTHESIA POSTPROCEDURE EVALUATION
Post-Op Assessment Note    CV Status:  Stable  Pain Score: 0    Pain management: adequate    Multimodal analgesia used between 6 hours prior to anesthesia start to PACU discharge    Mental Status:  Sleepy and arousable   Hydration Status:  Euvolemic and stable   PONV Controlled:  None   Airway Patency:  Patent and adequate     Post Op Vitals Reviewed: Yes    No anethesia notable event occurred.    Staff: CRNA               /57 (08/09/24 1253)    Temp 98.4 °F (36.9 °C) (08/09/24 1253)    Pulse (!) 109 (08/09/24 1253)   Resp 14 (08/09/24 1253)    SpO2 92 % (08/09/24 1253)

## 2024-08-09 NOTE — CASE MANAGEMENT
Case Management Discharge Planning Note    Patient name Aurelia Mccall  Location [facility] 2 /[facility] 2 M* MRN [id number]  : 2000 Date 2024       Current Admission Date: 2024  Current Admission Diagnosis:Purulent appendicitis   Patient Active Problem List    Diagnosis Date Noted Date Diagnosed    Purulent appendicitis 2024     Chronic bilateral thoracic back pain 2022     Large breasts 2022     Allergic rhinitis due to pollen 2017       LOS (days): 0  Geometric Mean LOS (GMLOS) (days):   Days to GMLOS:     OBJECTIVE:        Current admission status: Outpatient Surgery   Preferred Pharmacy:   [facility] [address] [place] [id number]  Phone: [phone] Fax: [phone]    Primary Care Provider: Kenji Recinos DO    Primary Insurance:   Secondary Insurance:     DISCHARGE DETAILS:    Additional Comments: CM consulted for pt insurance questions. CM met with pt, pt's mother and father at bedside and introduced self and role. Pt's mother reported [employer] told her they would be at bedside to meet with them at noon today and have not showed up yet. CM instructed pt's mother to call [employer] again and tell them pt is written for discharge. Pt's mother reports she used to work for [employer] and has their contact information and knows how to referral process works.

## 2024-08-09 NOTE — ANESTHESIA PREPROCEDURE EVALUATION
Procedure:  APPENDECTOMY LAPAROSCOPIC, possible open (Abdomen)    Relevant Problems   ANESTHESIA (within normal limits)      CARDIO  Tachycardia noted   (+) Chronic bilateral thoracic back pain      ENDO (within normal limits)      GI/HEPATIC  Acute appendicitis      MUSCULOSKELETAL   (+) Chronic bilateral thoracic back pain      NEURO/PSYCH   (+) Chronic bilateral thoracic back pain      PULMONARY (within normal limits)        Physical Exam    Airway    Mallampati score: I  TM Distance: >3 FB  Neck ROM: full     Dental   No notable dental hx     Cardiovascular  Cardiovascular exam normal    Pulmonary  Pulmonary exam normal     Other Findings  post-pubertal.      Anesthesia Plan  ASA Score- 2 Emergent    Anesthesia Type- general with ASA Monitors.         Additional Monitors:     Airway Plan: ETT.           Plan Factors-    Chart reviewed.  Imaging results reviewed. Existing labs reviewed. Patient summary reviewed.                  Induction- intravenous.    Postoperative Plan-     Perioperative Resuscitation Plan - Level 1 - Full Code.       Informed Consent- Anesthetic plan and risks discussed with patient.

## 2024-08-09 NOTE — NURSING NOTE
AVS reviewed with patient, mother, father, and other family present. Questions answered at this time. IV and Masimo removed from patient. Work note provided for patient. Patient discharged via wheelchair accompanied by RN, mother, father, and other family.

## 2024-08-09 NOTE — OP NOTE
OPERATIVE REPORT  PATIENT NAME: Aurelia Mccall    :  2000  MRN: [id number]  Pt Location: [facility] OR ROOM 07    SURGERY DATE: 2024    Surgeons and Role:     * Vinh Lagunas MD - Primary     * Jessie Cuenca PA-C - Assisting    Preop Diagnosis:  Acute appendicitis with localized peritonitis, unspecified whether abscess present, unspecified whether gangrene present, unspecified whether perforation present [K35.30]    Post-Op Diagnosis Codes:     * Acute appendicitis with localized peritonitis, unspecified whether abscess present, unspecified whether gangrene present, unspecified whether perforation present [K35.30]    Procedure(s):  APPENDECTOMY LAPAROSCOPIC. possible open    Specimen(s):  ID Type Source Tests Collected by Time Destination   1 :  Tissue Appendix TISSUE EXAM Vinh Lagunas MD 2024 1153        Estimated Blood Loss:   Minimal    Drains:  * No LDAs found *    Anesthesia Type:   General    Operative Indications:  Acute appendicitis with localized peritonitis, unspecified whether abscess present, unspecified whether gangrene present, unspecified whether perforation present [K35.30]      Operative Findings:  Purulent appendicitis      Complications:   None    Procedure and Technique:  The patient was seen again in the Holding Room. The risks, benefits, complications, treatment options, and expected outcomes were discussed with the patient and/or family. The possibilities of reaction to medication, pulmonary aspiration, perforation of viscus, bleeding, recurrent infection, finding a normal appendix, the need for additional procedures, failure to diagnose a condition, and creating a complication requiring transfusion or operation were discussed. There was concurrence with the proposed plan and informed consent was obtained. The site of surgery was properly noted/marked. The patient was taken to Operating Room, identified as Aurelia Mccall and the procedure verified as Appendectomy. A Time Out was  held after the prep and draping,  and the above information confirmed.    The patient was placed in the supine position and general anesthesia was induced, along with placement of orogastric tube, Venodyne boots. The abdomen was prepped and draped in a sterile fashion. An infraumbilical incision was made and an open technique was used to enter the abdomen.  An 12mm port was placed and a pneumoperitoneum created.  Additional ports were placed under direct vision in the routine positions.  A careful evaluation of the entire abdomen was carried out. The patient was placed in Trendelenburg and left lateral decubitus position. The small intestines were retracted in the cephalad and left lateral direction away from the pelvis and right lower quadrant.     There was purulence in the pelvis and right lower quadrant that was suctioned out.  The appendix was stuck underneath the sigmoid colon that was bluntly dissected off and the terminal ileum was also tested off the appendix.  The appendix was identified and was inflamed thickened but no evidence of perforation.    The appendix was carefully dissected. The mesentery was dissected with the harmonic scalpel. A endo-FERNANDO stapler with a blue load was fire at the base of the appendix at the level of the cecum.  There was no evidence of bleeding, leakage, or complication after division of the appendix and mesoappendix. Irrigation was also performed and irrigate suctioned from the abdomen as well.    The umbilical port site fascia was closed using a 0-Vicryl figure of eight using the renfac suture passer. All skin incisions were closed using MonocryI suture in a subcuticular fashion.  The instrument, sponge, and needle counts were correct at the conclusion of the case.     Physician Assistant medically necessary for surgical safety of case and for suturing, retraction, and hemostasis.      I was present for the entire procedure., A qualified resident physician was not available.,  and A physician assistant was required during the procedure for retraction, tissue handling, dissection and suturing.    Patient Disposition:  PACU           SIGNATURE: Vinh Lagunas MD  DATE: August 9, 2024  TIME: 12:28 PM

## 2024-08-09 NOTE — DISCHARGE INSTR - AVS FIRST PAGE
[facility] General Surgery [facility]     Post-Operative Care Instructions     Dr. Vinh Lagunas MD, [facility]     [phone]          1. General: You will feel pulling sensations around the wound or funny aches and pains around the incisions. This is normal. Even minor surgery is a change in your body and this is your body’s way of reacting to it. If you have had abdominal surgery, it may help to support the incision with a small pillow or blanket for comfort when moving or coughing.     2. Wound care:  Okay to shower.  The glue will fall off over the next week or 2.   Use ice for at least the 1st 48 hours.  Do not use for longer than 20 minutes at a time. Use 3 times per day.     3. Water: You may shower over the wounds. Do not bathe or use a pool or hot tub until cleared by the physician.   If you were discharged with a drain, make sure drain site is covered with plastic wrap before showering.      4. Activity: You may go up and down stairs, walk as much as you are comfortable, but walk at least 3 times each day. If you have had abdominal surgery, do not lift anything heavier than 20 pounds for at least 4 weeks.      5. Diet: You may resume a regular diet. If you had a same-day surgery or overnight stay surgery, you may wish to eat lightly for a few days: soups, crackers, and sandwiches. You may resume a regular diet when ready.      6. Medications: Resume all of your previous medications, unless told otherwise by the doctor. Avoid aspirin products for 2-3 days after the date of surgery. You may, at that time, began to take them again. Use Tylenol and Ibuprofen for pain control.  You may alternate these medications every 3 hours.  For example: you may take Tylenol at noon, Ibuprofen at 3:00 p.m., and Tylenol again at 6:00 p.m., etc. You should use ice to assist with pain control as above.  You do not need to take narcotic pain medication unless you are having significant pain.   If you were prescribed a  narcotic pain medication containing Tylenol, such as Percocet or Norco, do not use supplemental Tylenol.      7. Driving: You will need someone to drive you home on the day of surgery or discharge. Do not drive or make any important decisions while on narcotic pain medication or 24 hours and after anesthesia or sedation for surgery. Generally, you may drive when your off all narcotic pain medications and you are comfortable.      8. Upset Stomach: You may take Maalox, Tums, or similar items for an upset stomach. If your narcotic pain medication causes an upset stomach, do not take it on an empty stomach. Try taking it with at least some crackers or toast.      9. Constipation: Patients often experience constipation after surgery. You may take over-the-counter medication for this, such as Metamucil, Senokot, Dulcolax, milk of magnesia, etc. You may take a suppository unless you have had anorectal surgery such as a procedure on your hemorrhoids. If you experience significant nausea or vomiting after abdominal surgery, call the office before trying any of these medications.     10. Call the office: If you are experiencing any of the following: fevers above 101.5°, significant nausea or vomiting, if the wound develops drainage and/or there is excessive redness around the wound, or if you have significant diarrhea or other worsening symptoms.     11. Pain: You may be given a prescription for pain medication.  This will be sent to your pharmacy prior to discharge.     12. Sexual Activity: You may resume sexual activity when you feel ready and comfortable and your incision is sealed and healed without apparent infection risk.     13. Urination: If you have not urinated in 6 hours, go directly to the ER for evaluation for urinary retention.      14. Follow-up in 2 weeks.          **READ ONLY IF YOU HAVE BEEN DISCHARGED WITH A URINARY CATHETER**    Mccle[place]and Insertion for Post-Op Urinary Retention        - A prescription for  Flomax will be sent to your pharmacy.  This should be taken daily while the urinary catheter remains in place.    You will not be given a prescription for Flomax if your prostate has been removed.  If you are already taking Flomax, continue the medication as prescribed.     - We will send a message to the urology group who will contact you within the next 48 hours with further instructions and to schedule an appointment for voiding trial and catheter removal.  The urinary catheter will remain in place for approximately 1 week.  If you are not contacted within the next 48 hours please call our office to assist with scheduling your follow-up.     - If you have your own urologist, you should contact your physician the day after discharge for instructions and to schedule a voiding trial and catheter removal.

## 2024-08-09 NOTE — LETTER
[place] [id number]  [address] [facility] [address]  Dept: [phone]    August 9, 2024     Patient: Aurelia Mccall   YOB: 2000   Date of Visit: 8/9/2024       To Whom it May Concern:    Aurelia Mccall is under my professional care. She was seen in the hospital on 8/9/2024. She may return to work on 8/16/24 or sooner if tolerated, with the following limitations - no lifting anything over 10-15 pounds for 4-6 weeks .    If you have any questions or concerns, please don't hesitate to call.         Sincerely,          Fanta Smith MD

## 2024-08-11 LAB
BACTERIA BLD CULT: NORMAL
BACTERIA BLD CULT: NORMAL

## 2024-08-12 ENCOUNTER — TELEPHONE (OUTPATIENT)
Age: 24
End: 2024-08-12

## 2024-08-12 NOTE — TELEPHONE ENCOUNTER
Patient of Dr Lagunas s/p appendectomy on 8/9.    Patients sister calling about possible reaction to post op abx. States patient tolerated for the first 2 days but started Sunday with vomiting 1 hour after taking Augmentin.  States she vomited x3 yesterday. Patient took medication with food but still unable to tolerate. States that patient was able to tolerate food and fluids otherwise but vomited shortly after taking Augmentin.   She also noted that she has not had a BM since 8/7. She started with Miralax yesterday. She is passing gas. Denies fever or other symptoms at this time.   She has not been taking oxycodone, has been managing with ibuprofen. Incision sites appear unremarkable.    She would like to know if she should discontinue the antibiotic or if it should be changed.    Please advise  Mother-Paulina

## 2024-08-12 NOTE — TELEPHONE ENCOUNTER
Veronica called in because she keeps getting text messages from [facility]. She was never a [facility]'s patient. Her telephone number is on this patient's file.  She asked  that we delete it . I told her that we will remove it so that she does't receive anymore calls for this patient. Sending this message to surgery coordinator she patient just had surgery with Dr Lagunas

## 2024-08-13 PROCEDURE — 88304 TISSUE EXAM BY PATHOLOGIST: CPT | Performed by: PATHOLOGY

## 2024-08-14 LAB
BACTERIA BLD CULT: NORMAL
BACTERIA BLD CULT: NORMAL

## 2024-09-03 ENCOUNTER — TELEPHONE (OUTPATIENT)
Dept: SURGERY | Facility: CLINIC | Age: 24
End: 2024-09-03

## 2024-09-03 NOTE — TELEPHONE ENCOUNTER
Per LIZZY Bridges, made call to pt and spoke with her. Pt said she is feeling fine right now and doesn't want to schedule a post op. (Pt canceled her 8/27 post op w/Dr Lagunas, and no showed for her 8/28 post op w/LIZZY Lamar.     Advised pt I would note this in her chart and pt said ok.

## 2024-11-14 ENCOUNTER — OFFICE VISIT (OUTPATIENT)
Dept: FAMILY MEDICINE CLINIC | Facility: CLINIC | Age: 24
End: 2024-11-14
Payer: COMMERCIAL

## 2024-11-14 VITALS
DIASTOLIC BLOOD PRESSURE: 62 MMHG | WEIGHT: 182.6 LBS | SYSTOLIC BLOOD PRESSURE: 108 MMHG | BODY MASS INDEX: 36.81 KG/M2 | HEART RATE: 79 BPM | HEIGHT: 59 IN | TEMPERATURE: 97.6 F | OXYGEN SATURATION: 98 %

## 2024-11-14 DIAGNOSIS — Z00.00 ANNUAL PHYSICAL EXAM: Primary | ICD-10-CM

## 2024-11-14 DIAGNOSIS — G89.29 CHRONIC BILATERAL THORACIC BACK PAIN: ICD-10-CM

## 2024-11-14 DIAGNOSIS — M54.6 CHRONIC BILATERAL THORACIC BACK PAIN: ICD-10-CM

## 2024-11-14 DIAGNOSIS — N62 LARGE BREASTS: ICD-10-CM

## 2024-11-14 PROCEDURE — 99395 PREV VISIT EST AGE 18-39: CPT | Performed by: FAMILY MEDICINE

## 2024-11-14 PROCEDURE — 99214 OFFICE O/P EST MOD 30 MIN: CPT | Performed by: FAMILY MEDICINE

## 2024-11-14 NOTE — PROGRESS NOTES
Adult Annual Physical  Name: Aurelia Mccall      : 2000      MRN: [id number]  Encounter Provider: Una Macias DO  Encounter Date: 2024   Encounter department: [facility]    Assessment & Plan  Annual physical exam         Large breasts    Orders:    Ambulatory Referral to Plastic Surgery; Future    Chronic bilateral thoracic back pain    Orders:    Ambulatory Referral to Plastic Surgery; Future      Immunizations and preventive care screenings were discussed with patient today. Appropriate education was printed on patient's after visit summary.    Counseling:  Alcohol/drug use: discussed moderation in alcohol intake, the recommendations for healthy alcohol use, and avoidance of illicit drug use.  Dental Health: discussed importance of regular tooth brushing, flossing, and dental visits.  Injury prevention: discussed safety/seat belts, safety helmets, smoke detectors, carbon monoxide detectors, and smoking near bedding or upholstery.  Sexual health: discussed sexually transmitted diseases, partner selection, use of condoms, avoidance of unintended pregnancy, and contraceptive alternatives.  Exercise: the importance of regular exercise/physical activity was discussed. Recommend exercise 3-5 times per week for at least 30 minutes.       Depression Screening and Follow-up Plan: Patient was screened for depression during today's encounter. They screened negative with a PHQ-2 score of 0.        History of Present Illness     Adult Annual Physical:  Patient presents for annual physical.     Diet and Physical Activity:  - Diet/Nutrition: poor diet.  - Exercise: no formal exercise.    Depression Screening:  - PHQ-2 Score: 0    General Health:  - Sleep: sleeps well and 7-8 hours of sleep on average.  - Hearing: normal hearing bilateral ears.  - Vision: wears glasses, most recent eye exam > 1 year ago and goes for regular eye exams.  - Dental: regular dental visits and brushes teeth twice  "daily.    /GYN Health:  - Follows with GYN: no.   - Menopause: premenopausal.   - History of STDs: no    Advanced Care Planning:  - Has an advanced directive?: no    - Has a durable medical POA?: no    - ACP document given to patient?: no      Review of Systems   Constitutional:  Negative for chills and fever.   HENT:  Negative for ear pain and sore throat.    Eyes:  Negative for pain and visual disturbance.   Respiratory:  Negative for cough and shortness of breath.    Cardiovascular:  Negative for chest pain and palpitations.   Gastrointestinal:  Negative for abdominal pain and vomiting.   Genitourinary:  Negative for dysuria and hematuria.   Musculoskeletal:  Positive for arthralgias and back pain.   Skin:  Negative for color change and rash.   Neurological:  Negative for seizures and syncope.   All other systems reviewed and are negative.    Medical History Reviewed by provider this encounter:  Tobacco  Allergies  Meds  Problems  Med Hx  Surg Hx  Fam Hx     .  No current outpatient medications on file prior to visit.     No current facility-administered medications on file prior to visit.      Social History     Tobacco Use    Smoking status: Never    Smokeless tobacco: Never   Vaping Use    Vaping status: Never Used   Substance and Sexual Activity    Alcohol use: Yes     Comment: social    Drug use: No    Sexual activity: Not Currently     Partners: Male       Objective   /62 (BP Location: Left arm, Patient Position: Sitting, Cuff Size: Large)   Pulse 79   Temp 97.6 °F (36.4 °C) (Temporal)   Ht 4' 11\" (1.499 m)   Wt 82.8 kg (182 lb 9.6 oz)   LMP 10/31/2024   SpO2 98%   BMI 36.88 kg/m²     Physical Exam  Vitals reviewed.   Constitutional:       General: She is not in acute distress.     Appearance: Normal appearance. She is well-developed.   HENT:      Head: Normocephalic and atraumatic.      Nose: No congestion or rhinorrhea.   Eyes:      Conjunctiva/sclera: Conjunctivae normal. "   Cardiovascular:      Rate and Rhythm: Normal rate and regular rhythm.      Pulses: Normal pulses.      Heart sounds: Normal heart sounds. No murmur heard.  Pulmonary:      Effort: Pulmonary effort is normal. No respiratory distress.      Breath sounds: Normal breath sounds.   Abdominal:      Palpations: Abdomen is soft.      Tenderness: There is no abdominal tenderness.   Musculoskeletal:         General: No swelling.      Cervical back: Neck supple.   Skin:     General: Skin is warm and dry.      Capillary Refill: Capillary refill takes less than 2 seconds.   Neurological:      Mental Status: She is alert.   Psychiatric:         Mood and Affect: Mood normal.       Administrative Statements   I have spent a total time of 43 minutes in caring for this patient on the day of the visit/encounter including Diagnostic results, Prognosis, Risks and benefits of tx options, Instructions for management, Impressions, Counseling / Coordination of care, Documenting in the medical record, Reviewing / ordering tests, medicine, procedures  , and Obtaining or reviewing history  .

## 2024-11-14 NOTE — LETTER
November 14, 2024     Patient: Aurelia Mccall  YOB: 2000  Date of Visit: 11/14/2024      To Whom it May Concern:    Aurelia Mccall is under my professional care. Aurelia was seen in my office on 11/14/2024. Aurelia would benefit from breast reduction surgery due to chronic mid to low back pain since her early teenage years.  She currently has a cup size I for at least the past 4-5 years.  Without the surgery her back pain will worsen that can possibly lead to spinal curvature, compensatory pain either above or below the location and causing mental, physical and financial distress.    If you have any questions or concerns, please don't hesitate to call.         Sincerely,          Una Macias DO        CC: No Recipients

## 2024-11-14 NOTE — PATIENT INSTRUCTIONS
"Diet:  Do not skip breakfast and make sure breakfast is heavy protein.  Do not eat 3 hours before bed all you can have is water.  Exercise:  Try to do 150 minutes of moderate intensity exercise which includes very fast walking or doing weights.      Patient Education     Routine physical for adults   The Basics   Written by the doctors and editors at [facility]   What is a physical? -- A physical is a routine visit, or \"check-up,\" with your doctor. You might also hear it called a \"wellness visit\" or \"preventive visit.\"  During each visit, the doctor will:   Ask about your physical and mental health   Ask about your habits, behaviors, and lifestyle   Do an exam   Give you vaccines if needed   Talk to you about any medicines you take   Give advice about your health   Answer your questions  Getting regular check-ups is an important part of taking care of your health. It can help your doctor find and treat any problems you have. But it's also important for preventing health problems.  A routine physical is different from a \"sick visit.\" A sick visit is when you see a doctor because of a health concern or problem. Since physicals are scheduled ahead of time, you can think about what you want to ask the doctor.  How often should I get a physical? -- It depends on your age and health. In general, for people age 21 years and older:   If you are younger than 50 years, you might be able to get a physical every 3 years.   If you are 50 years or older, your doctor might recommend a physical every year.  If you have an ongoing health condition, like diabetes or high blood pressure, your doctor will probably want to see you more often.  What happens during a physical? -- In general, each visit will include:   Physical exam - The doctor or nurse will check your height, weight, heart rate, and blood pressure. They will also look at your eyes and ears. They will ask about how you are feeling and whether you have any symptoms that " "bother you.   Medicines - It's a good idea to bring a list of all the medicines you take to each doctor visit. Your doctor will talk to you about your medicines and answer any questions. Tell them if you are having any side effects that bother you. You should also tell them if you are having trouble paying for any of your medicines.   Habits and behaviors - This includes:   Your diet   Your exercise habits   Whether you smoke, drink alcohol, or use drugs   Whether you are sexually active   Whether you feel safe at home  Your doctor will talk to you about things you can do to improve your health and lower your risk of health problems. They will also offer help and support. For example, if you want to quit smoking, they can give you advice and might prescribe medicines. If you want to improve your diet or get more physical activity, they can help you with this, too.   Lab tests, if needed - The tests you get will depend on your age and situation. For example, your doctor might want to check your:   Cholesterol   Blood sugar   Iron level   Vaccines - The recommended vaccines will depend on your age, health, and what vaccines you already had. Vaccines are very important because they can prevent certain serious or deadly infections.   Discussion of screening - \"Screening\" means checking for diseases or other health problems before they cause symptoms. Your doctor can recommend screening based on your age, risk, and preferences. This might include tests to check for:   Cancer, such as breast, prostate, cervical, ovarian, colorectal, prostate, lung, or skin cancer   Sexually transmitted infections, such as chlamydia and gonorrhea   Mental health conditions like depression and anxiety  Your doctor will talk to you about the different types of screening tests. They can help you decide which screenings to have. They can also explain what the results might mean.   Answering questions - The physical is a good time to ask the " doctor or nurse questions about your health. If needed, they can refer you to other doctors or specialists, too.  Adults older than 65 years often need other care, too. As you get older, your doctor will talk to you about:   How to prevent falling at home   Hearing or vision tests   Memory testing   How to take your medicines safely   Making sure that you have the help and support you need at home  All topics are updated as new evidence becomes available and our peer review process is complete.  This topic retrieved from [employer] on: May 02, 2024.  Topic 399416 Version 1.0  Release: 32.4.3 - C32.122  © 2024 [employer] and/or its affiliates. All rights reserved.  Consumer Information Use and Disclaimer   Disclaimer: This generalized information is a limited summary of diagnosis, treatment, and/or medication information. It is not meant to be comprehensive and should be used as a tool to help the user understand and/or assess potential diagnostic and treatment options. It does NOT include all information about conditions, treatments, medications, side effects, or risks that may apply to a specific patient. It is not intended to be medical advice or a substitute for the medical advice, diagnosis, or treatment of a health care provider based on the health care provider's examination and assessment of a patient's specific and unique circumstances. Patients must speak with a health care provider for complete information about their health, medical questions, and treatment options, including any risks or benefits regarding use of medications. This information does not endorse any treatments or medications as safe, effective, or approved for treating a specific patient. [employer], Inc. and its affiliates disclaim any warranty or liability relating to this information or the use thereof.The use of this information is governed by the Terms of Use, available at  https://www.woltersJamiiuwer.com/en/know/clinical-effectiveness-terms. 2024© [employer] and its affiliates and/or licensors. All rights reserved.  Copyright   © 2024 [employer] and/or its affiliates. All rights reserved.

## 2025-03-04 ENCOUNTER — TELEPHONE (OUTPATIENT)
Dept: PLASTIC SURGERY | Facility: CLINIC | Age: 25
End: 2025-03-04

## (undated) DEVICE — ENDOPATH ETS-FLEX45 ARTICULATING ENDOSCOPIC LINEAR CUTTER, NO RELOAD: Brand: ENDOPATH

## (undated) DEVICE — SCD SEQUENTIAL COMPRESSION COMFORT SLEEVE MEDIUM KNEE LENGTH: Brand: KENDALL SCD

## (undated) DEVICE — INTENDED FOR TISSUE SEPARATION, AND OTHER PROCEDURES THAT REQUIRE A SHARP SURGICAL BLADE TO PUNCTURE OR CUT.: Brand: BARD-PARKER SAFETY BLADES SIZE 11, STERILE

## (undated) DEVICE — ETS45 RELOAD STANDARD 45MM: Brand: ENDOPATH

## (undated) DEVICE — [HIGH FLOW INSUFFLATOR,  DO NOT USE IF PACKAGE IS DAMAGED,  KEEP DRY,  KEEP AWAY FROM SUNLIGHT,  PROTECT FROM HEAT AND RADIOACTIVE SOURCES.]: Brand: PNEUMOSURE

## (undated) DEVICE — GLOVE INDICATOR PI UNDERGLOVE SZ 8 BLUE

## (undated) DEVICE — SUT VICRYL 0 UR-6 27 IN J603H

## (undated) DEVICE — GLOVE INDICATOR PI UNDERGLOVE SZ 6.5 BLUE

## (undated) DEVICE — HARMONIC ACE 5MM DIAMETER SHEARS 36CM SHAFT LENGTH + ADAPTIVE TISSUE TECHNOLOGY FOR USE WITH GENERATOR G11: Brand: HARMONIC ACE

## (undated) DEVICE — TRAY FOLEY 16FR URIMETER SURESTEP

## (undated) DEVICE — PMI DISPOSABLE PUNCTURE CLOSURE DEVICE / SUTURE GRASPER: Brand: PMI

## (undated) DEVICE — TROCAR: Brand: KII SLEEVE

## (undated) DEVICE — GLOVE SRG BIOGEL ECLIPSE 7.5

## (undated) DEVICE — PENCIL ELECTROSURG E-Z CLEAN -0035H

## (undated) DEVICE — ALLENTOWN LAP CHOLE APP PACK: Brand: CARDINAL HEALTH

## (undated) DEVICE — TROCAR: Brand: KII FIOS FIRST ENTRY

## (undated) DEVICE — SUT MONOCRYL 4-0 PS-2 27 IN Y426H

## (undated) DEVICE — 3000CC GUARDIAN II: Brand: GUARDIAN

## (undated) DEVICE — DRAPE EQUIPMENT RF WAND

## (undated) DEVICE — GLOVE SRG BIOGEL 6.5

## (undated) DEVICE — EXOFIN PRECISION PEN HIGH VISCOSITY TOPICAL SKIN ADHESIVE: Brand: EXOFIN PRECISION PEN, 1G

## (undated) DEVICE — CHLORAPREP HI-LITE 26ML ORANGE

## (undated) DEVICE — TISSUE RETRIEVAL SYSTEM: Brand: INZII RETRIEVAL SYSTEM

## (undated) DEVICE — BLUE HEAT SCOPE WARMER

## (undated) DEVICE — 2000CC GUARDIAN II: Brand: GUARDIAN